# Patient Record
Sex: FEMALE | Race: WHITE | NOT HISPANIC OR LATINO
[De-identification: names, ages, dates, MRNs, and addresses within clinical notes are randomized per-mention and may not be internally consistent; named-entity substitution may affect disease eponyms.]

---

## 2017-03-22 ENCOUNTER — APPOINTMENT (OUTPATIENT)
Dept: DERMATOLOGY | Facility: CLINIC | Age: 67
End: 2017-03-22

## 2018-03-23 ENCOUNTER — APPOINTMENT (OUTPATIENT)
Dept: DERMATOLOGY | Facility: CLINIC | Age: 68
End: 2018-03-23

## 2018-11-09 ENCOUNTER — APPOINTMENT (OUTPATIENT)
Dept: DERMATOLOGY | Facility: CLINIC | Age: 68
End: 2018-11-09

## 2019-04-03 ENCOUNTER — APPOINTMENT (OUTPATIENT)
Dept: DERMATOLOGY | Facility: CLINIC | Age: 69
End: 2019-04-03
Payer: MEDICARE

## 2019-04-03 PROCEDURE — 99213 OFFICE O/P EST LOW 20 MIN: CPT

## 2020-04-03 ENCOUNTER — APPOINTMENT (OUTPATIENT)
Dept: DERMATOLOGY | Facility: CLINIC | Age: 70
End: 2020-04-03

## 2021-01-22 ENCOUNTER — APPOINTMENT (OUTPATIENT)
Dept: DERMATOLOGY | Facility: CLINIC | Age: 71
End: 2021-01-22

## 2021-04-02 ENCOUNTER — APPOINTMENT (OUTPATIENT)
Dept: DISASTER EMERGENCY | Facility: CLINIC | Age: 71
End: 2021-04-02

## 2021-04-03 LAB — SARS-COV-2 N GENE NPH QL NAA+PROBE: NOT DETECTED

## 2021-04-04 ENCOUNTER — FORM ENCOUNTER (OUTPATIENT)
Age: 71
End: 2021-04-04

## 2021-04-05 ENCOUNTER — TRANSCRIPTION ENCOUNTER (OUTPATIENT)
Age: 71
End: 2021-04-05

## 2021-04-05 ENCOUNTER — OUTPATIENT (OUTPATIENT)
Dept: OUTPATIENT SERVICES | Facility: HOSPITAL | Age: 71
LOS: 1 days | End: 2021-04-05
Payer: MEDICARE

## 2021-04-05 VITALS
TEMPERATURE: 98 F | OXYGEN SATURATION: 99 % | WEIGHT: 135.14 LBS | DIASTOLIC BLOOD PRESSURE: 68 MMHG | SYSTOLIC BLOOD PRESSURE: 122 MMHG | HEIGHT: 60 IN | HEART RATE: 79 BPM | RESPIRATION RATE: 18 BRPM

## 2021-04-05 DIAGNOSIS — I34.0 NONRHEUMATIC MITRAL (VALVE) INSUFFICIENCY: ICD-10-CM

## 2021-04-05 LAB
ANION GAP SERPL CALC-SCNC: 6 MMOL/L — SIGNIFICANT CHANGE UP (ref 5–17)
BUN SERPL-MCNC: 15 MG/DL — SIGNIFICANT CHANGE UP (ref 8–20)
CALCIUM SERPL-MCNC: 9.5 MG/DL — SIGNIFICANT CHANGE UP (ref 8.6–10.2)
CHLORIDE SERPL-SCNC: 106 MMOL/L — SIGNIFICANT CHANGE UP (ref 98–107)
CO2 SERPL-SCNC: 29 MMOL/L — SIGNIFICANT CHANGE UP (ref 22–29)
CREAT SERPL-MCNC: 0.64 MG/DL — SIGNIFICANT CHANGE UP (ref 0.5–1.3)
GLUCOSE SERPL-MCNC: 97 MG/DL — SIGNIFICANT CHANGE UP (ref 70–99)
POTASSIUM SERPL-MCNC: 4.2 MMOL/L — SIGNIFICANT CHANGE UP (ref 3.5–5.3)
POTASSIUM SERPL-SCNC: 4.2 MMOL/L — SIGNIFICANT CHANGE UP (ref 3.5–5.3)
SODIUM SERPL-SCNC: 141 MMOL/L — SIGNIFICANT CHANGE UP (ref 135–145)

## 2021-04-05 PROCEDURE — 93320 DOPPLER ECHO COMPLETE: CPT

## 2021-04-05 PROCEDURE — 36415 COLL VENOUS BLD VENIPUNCTURE: CPT

## 2021-04-05 PROCEDURE — 93005 ELECTROCARDIOGRAM TRACING: CPT

## 2021-04-05 PROCEDURE — 85730 THROMBOPLASTIN TIME PARTIAL: CPT

## 2021-04-05 PROCEDURE — 85610 PROTHROMBIN TIME: CPT

## 2021-04-05 PROCEDURE — 93010 ELECTROCARDIOGRAM REPORT: CPT

## 2021-04-05 PROCEDURE — 80048 BASIC METABOLIC PNL TOTAL CA: CPT

## 2021-04-05 PROCEDURE — 85025 COMPLETE CBC W/AUTO DIFF WBC: CPT

## 2021-04-05 PROCEDURE — 93325 DOPPLER ECHO COLOR FLOW MAPG: CPT

## 2021-04-05 PROCEDURE — 93312 ECHO TRANSESOPHAGEAL: CPT

## 2021-04-05 RX ORDER — CHLORHEXIDINE GLUCONATE 213 G/1000ML
1 SOLUTION TOPICAL ONCE
Refills: 0 | Status: DISCONTINUED | OUTPATIENT
Start: 2021-04-05 | End: 2021-04-19

## 2021-04-05 NOTE — H&P PST ADULT - NSICDXPASTMEDICALHX_GEN_ALL_CORE_FT
PAST MEDICAL HISTORY:  HLD (hyperlipidemia)     MVP (mitral valve prolapse)     Severe mitral regurgitation

## 2021-04-05 NOTE — DISCHARGE NOTE PROVIDER - NSDCFUADDAPPT_GEN_ALL_CORE_FT
Please call and make an appointment with Dr. Loredo in 1-2 weeks for post procedure hospital follow up and outpatient work up for severe Mitral Regurgitation.   Please call and make an appointment with Dr. Loredo in 1-2 weeks for post procedure hospital follow up and outpatient work up for severe Mitral Regurgitation, valve repair.     Outpatient right and left heart catheterization to be arranged by cardiology.

## 2021-04-05 NOTE — DISCHARGE NOTE PROVIDER - NSDCCPTREATMENT_GEN_ALL_CORE_FT
PRINCIPAL PROCEDURE  Procedure: Transesophageal echocardiography  Findings and Treatment:        PRINCIPAL PROCEDURE  Procedure: Transesophageal echocardiography  Findings and Treatment: Transechocardiogram which showed Myxomatous MV with flail P2, ruptured chordae and severe eccentric MR. Trace TR. ERO 0.8 cm2, regurgitant volume 111 ml  -continue current medical management   -will need right and left heart catheterization  -Valve repair   -follow with Dr. Loredo

## 2021-04-05 NOTE — DISCHARGE NOTE NURSING/CASE MANAGEMENT/SOCIAL WORK - PATIENT PORTAL LINK FT
You can access the FollowMyHealth Patient Portal offered by [facility] by registering at the following website: [email or web address]. By joining [employer]’s FollowMyHealth portal, you will also be able to view your health information using other applications (apps) compatible with our system.

## 2021-04-05 NOTE — H&P PST ADULT - HISTORY OF PRESENT ILLNESS
Narrative:     69 yo female with PMHX of HLD, Mitral Valve Prolapse with previous moderate mitral vegetation with normal left ventricular function, rate PSVT , previously documented atrial septal aneurysm without shunt who presents now for follow up. Last echocardiogram 3/12/21 showed LVEF 55-60%, normal RV size and function, severe posterior leaflet and eccentric severe mitral regurgitation trace TR, moderate pulmonary hypertension PA pressure estimatd at 58 mmHg, trace AI.     ASA   2  Mallampati   2    COVID-19    negative       Assessment of LVEF (Must be within 6 months):       EF:   55-60%        Assessed by:   Echocardiogram        Date:   3/12/21     Prior Cardiac Interventions (LHC, stents, CABG):       PCI's (Date, Stents, Vessels):   NO        CABG (Date, Grafts):   NO

## 2021-04-05 NOTE — DISCHARGE NOTE PROVIDER - CARE PROVIDER_API CALL
Sydnee Loredo)  Internal Medicine  [address] [place] [id number]  Phone: [phone]  Fax: [phone]  Follow Up Time:

## 2021-04-05 NOTE — DISCHARGE NOTE PROVIDER - NSDCMRMEDTOKEN_GEN_ALL_CORE_FT
atenolol 25 mg oral tablet: 1 tab(s) orally once a day  atorvastatin 10 mg oral tablet: 1 tab(s) orally once a day  Calcium 500+D oral tablet, chewable:

## 2021-04-05 NOTE — DISCHARGE NOTE PROVIDER - NSDCCPCAREPLAN_GEN_ALL_CORE_FT
PRINCIPAL DISCHARGE DIAGNOSIS  Diagnosis: Severe mitral valve stenosis  Assessment and Plan of Treatment: Patient with known severe mitral stenosis; found with severe posterior leaflet and eccentric severe mitral regurgitation trace TR, moderate pulmonary hypertension PA pressure estimatd at 58 mmHg, trace AI. Concern for ruptured chordae. Had transesophageal echocardiogram today to evaluate heart anatomy. Likely          PRINCIPAL DISCHARGE DIAGNOSIS  Diagnosis: Severe mitral valve stenosis  Assessment and Plan of Treatment: Patient with known severe mitral stenosis; found with severe posterior leaflet and eccentric severe mitral regurgitation trace TR, moderate pulmonary hypertension PA pressure estimatd at 58 mmHg, trace AI. Concern for ruptured chordae. Had transesophageal echocardiogram today to evaluate heart anatomy.

## 2021-04-05 NOTE — DISCHARGE NOTE NURSING/CASE MANAGEMENT/SOCIAL WORK - NSDCFUADDAPPT_GEN_ALL_CORE_FT
Please call and make an appointment with Dr. Loredo in 1-2 weeks for post procedure hospital follow up and outpatient work up for severe Mitral Regurgitation, valve repair.     Outpatient right and left heart catheterization to be arranged by cardiology.

## 2021-04-05 NOTE — H&P PST ADULT - ASSESSMENT
69 yo female with PMHX of HLD, Mitral Valve Prolapse with previous moderate mitral vegetation with normal left ventricular function, rate PSVT , previously documented atrial septal aneurysm without shunt who presents now for follow up. Last echocardiogram 3/12/21 showed LVEF 55-60%, normal RV size and function, severe posterior leaflet and eccentric severe mitral regurgitation trace TR, moderate pulmonary hypertension PA pressure estimatd at 58 mmHg, trace AI.       -pt has been NPO since MN  -labs, ekg ordered  -consent to be obtained by attending and anesthesia  -PARVEEN order placed

## 2021-04-05 NOTE — PROGRESS NOTE ADULT - ASSESSMENT
Assessment  Flail P2 MV with ruptured chordae and severe MR  Normal EF  Trace TR normal RV      Rec  R & L cardiac cath and elective MV repair

## 2021-04-05 NOTE — DISCHARGE NOTE PROVIDER - HOSPITAL COURSE
71 yo female with PMHX of HLD, Mitral Valve Prolapse with previous moderate mitral vegetation with normal left ventricular function, rate PSVT , previously documented atrial septal aneurysm without shunt who presents now for follow up. Last echocardiogram 3/12/21 showed LVEF 55-60%, normal RV size and function, severe posterior leaflet and eccentric severe mitral regurgitation trace TR, moderate pulmonary hypertension PA pressure estimatd at 58 mmHg, trace AI. 69 yo female with PMHX of HLD, Mitral Valve Prolapse with previous moderate mitral vegetation with normal left ventricular function, rate PSVT , previously documented atrial septal aneurysm without shunt who presents now for follow up. Last echocardiogram 3/12/21 showed LVEF 55-60%, normal RV size and function, severe posterior leaflet and eccentric severe mitral regurgitation trace TR, moderate pulmonary hypertension PA pressure estimatd at 58 mmHg, trace AI.     Patient now post transesophageal echocardiogram which showed Myxomatous MV with flail P2, ruptured chordae and severe eccentric MR. Trace TR. ERO 0.8 cm2, regurgitant volume 111 ml. Outpatient follow up for right and left heart catheterization prior to valve repair. Follow with Dr. Loredo for plan and scheduling.

## 2021-04-05 NOTE — DISCHARGE NOTE PROVIDER - NSDCFUADDINST_GEN_ALL_CORE_FT
-Take each dose of your anticoagulation medication (blood thinner) exactly as prescribed.   -Resume your diet with soft foods first.  - Do not drive, operate heavy machinery or make important decisions for 24 hours following the procedure.  - You may resume all other activities the day after the procedure.  Call your doctor if:   -you develop a fever, cough up blood, have any chest pain, palpitations or difficulty breathing. You may take a throat lozenge if you develop a sore throat or try warm salt water gargle.   - you have any questions or concerns regarding the procedure.  If you experience increased difficulty breathing or chest pain, or if you faint, have dizzy spells, or for any other distressing symptom, please seek immediate medical attention.

## 2021-04-05 NOTE — H&P PST ADULT - RS GEN PE MLT RESP DETAILS PC
normal/airway patent/respirations non-labored/clear to auscultation bilaterally/no rhonchi/no wheezes

## 2021-04-06 ENCOUNTER — APPOINTMENT (OUTPATIENT)
Dept: DISASTER EMERGENCY | Facility: CLINIC | Age: 71
End: 2021-04-06

## 2021-04-07 LAB — SARS-COV-2 N GENE NPH QL NAA+PROBE: NOT DETECTED

## 2021-04-08 RX ORDER — CHLORHEXIDINE GLUCONATE 213 G/1000ML
1 SOLUTION TOPICAL ONCE
Refills: 0 | Status: DISCONTINUED | OUTPATIENT
Start: 2021-04-09 | End: 2021-04-24

## 2021-04-08 NOTE — H&P PST ADULT - HISTORY OF PRESENT ILLNESS
71 yo female with PMHX of HLD, Mitral Valve Prolapse with previous moderate mitral vegetation with normal left ventricular function, rate PSVT , previously documented atrial septal aneurysm without shunt who presents now for follow up. Last echocardiogram 3/12/21 showed LVEF 55-60%, normal RV size and function, severe posterior leaflet and eccentric severe mitral regurgitation trace TR, moderate pulmonary hypertension PA pressure estimatd at 58 mmHg, trace AI.   PARVEEN performed on 4/4/21 revealed:   1. Left ventricular ejection fraction, by visual estimation, is 60 to 65%.   2. Normal left ventricular internal cavity size.   3. 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 ERO 0.8 cm2, regurgitant volume 111 ml. Confirmed with 3 D imaging.   4. Moderate to severe left atrial enlargement.   5. Elevated mean left atrial pressure.   6. No evidence of SEC nor thrombus in LA/PATRICIA. Max LA diameter 6.4 cm.   7. Normal right atrial size.   8. Sclerotic aortic valve with normal opening.   9. Color flow doppler and intravenous injection of agitated saline demonstrates the presence of an intact intra atrial septum.  10. No left atrial appendage thrombus and normal left atrial appendage velocities.    Pt presents for R&L heart cath for further evaluation of her coronary anatomy.    ASA   2  Mallampati   2    COVID-19    negative     Symptoms:        Angina (Class):        Ischemic Symptoms:     Heart Failure:        Systolic/Diastolic/Combined:        NYHA Class (within 2 weeks):     Assessment of LVEF (Must be within 6 months):       EF:   55-60%        Assessed by:   Echocardiogram        Date:   3/12/21     Prior Cardiac Interventions (LHC, stents, CABG):       PCI's (Date, Stents, Vessels):   NO        CABG (Date, Grafts):   NO     Antianginal Therapies:        Beta Blockers:  Atenolol       Calcium Channel Blockers:  NA       Long Acting Nitrates: NA       Ranexa: NA    Associated Risk Factors:        Cerebrovascular Disease: N/A       Chronic Lung Disease: N/A       Peripheral Arterial Disease: N/A       Chronic Kidney Disease (if yes, what is GFR): N/A       Uncontrolled Diabetes (if yes, what is HgbA1C or FBS): N/A       Poorly Controlled Hypertension (if yes, what is SBP): N/A       Morbid Obesity (if yes, what is BMI): N/A       History of Recent Ventricular Arrhythmia: SVT       Inability to Ambulate Safely: N/A       Need for Therapeutic Anticoagulation: N/A       Antiplatelet or Contrast Allergy: N/A 71 yo female with PMHX of HLD, Mitral Valve Prolapse with previous moderate mitral vegetation with normal left ventricular function, rate PSVT , previously documented atrial septal aneurysm without shunt who presents now for follow up. Last echocardiogram 3/12/21 showed LVEF 55-60%, normal RV size and function, severe posterior leaflet and eccentric severe mitral regurgitation trace TR, moderate pulmonary hypertension PA pressure estimatd at 58 mmHg, trace AI.   PARVEEN performed on 4/4/21 revealed:   1. Left ventricular ejection fraction, by visual estimation, is 60 to 65%.   2. Normal left ventricular internal cavity size.   3. 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 ERO 0.8 cm2, regurgitant volume 111 ml. Confirmed with 3 D imaging.   4. Moderate to severe left atrial enlargement.   5. Elevated mean left atrial pressure.   6. No evidence of SEC nor thrombus in LA/PATRICIA. Max LA diameter 6.4 cm.   7. Normal right atrial size.   8. Sclerotic aortic valve with normal opening.   9. Color flow doppler and intravenous injection of agitated saline demonstrates the presence of an intact intra atrial septum.  10. No left atrial appendage thrombus and normal left atrial appendage velocities.    Pt presents for R&L heart cath for further evaluation of her coronary anatomy.    ASA   2  Mallampati   2    COVID-19    negative     Symptoms:        Angina (Class):        Ischemic Symptoms: SHELTON    Assessment of LVEF (Must be within 6 months):       EF:   55-60%        Assessed by:   Echocardiogram        Date:   3/12/21     Prior Cardiac Interventions (LHC, stents, CABG):       PCI's (Date, Stents, Vessels):   NO        CABG (Date, Grafts):   NO     Antianginal Therapies:        Beta Blockers:  Atenolol       Calcium Channel Blockers:  NA       Long Acting Nitrates: NA       Ranexa: NA    Associated Risk Factors:        Cerebrovascular Disease: N/A       Chronic Lung Disease: N/A       Peripheral Arterial Disease: N/A       Chronic Kidney Disease (if yes, what is GFR): N/A       Uncontrolled Diabetes (if yes, what is HgbA1C or FBS): N/A       Poorly Controlled Hypertension (if yes, what is SBP): N/A       Morbid Obesity (if yes, what is BMI): N/A       History of Recent Ventricular Arrhythmia: SVT       Inability to Ambulate Safely: N/A       Need for Therapeutic Anticoagulation: N/A       Antiplatelet or Contrast Allergy: N/A 71 yo female with PMHX of HLD, Mitral Valve Prolapse with previous moderate mitral vegetation with normal left ventricular function, rate PSVT , previously documented atrial septal aneurysm without shunt who presents now for follow up. Last echocardiogram 3/12/21 showed LVEF 55-60%, normal RV size and function, severe posterior leaflet and eccentric severe mitral regurgitation trace TR, moderate pulmonary hypertension PA pressure estimatd at 58 mmHg, trace AI.   PARVEEN performed on 4/4/21 revealed:   1. Left ventricular ejection fraction, by visual estimation, is 60 to 65%.   2. Normal left ventricular internal cavity size.   3. 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 ERO 0.8 cm2, regurgitant volume 111 ml. Confirmed with 3 D imaging.   4. Moderate to severe left atrial enlargement.   5. Elevated mean left atrial pressure.   6. No evidence of SEC nor thrombus in LA/PATRICIA. Max LA diameter 6.4 cm.   7. Normal right atrial size.   8. Sclerotic aortic valve with normal opening.   9. Color flow doppler and intravenous injection of agitated saline demonstrates the presence of an intact intra atrial septum.  10. No left atrial appendage thrombus and normal left atrial appendage velocities.    Pt presents for R&L heart cath for further evaluation of her coronary anatomy.    ASA   2  Mallampati   2    GFR 94  BRA  2.4%  COVID-19    negative     Symptoms:        Angina (Class):        Ischemic Symptoms: SHELTON    Assessment of LVEF (Must be within 6 months):       EF:   55-60%        Assessed by:   Echocardiogram        Date:   3/12/21     Prior Cardiac Interventions (LHC, stents, CABG):       PCI's (Date, Stents, Vessels):   NO        CABG (Date, Grafts):   NO     Antianginal Therapies:        Beta Blockers:  Atenolol       Calcium Channel Blockers:  NA       Long Acting Nitrates: NA       Ranexa: NA    Associated Risk Factors:        Cerebrovascular Disease: N/A       Chronic Lung Disease: N/A       Peripheral Arterial Disease: N/A       Chronic Kidney Disease (if yes, what is GFR): N/A       Uncontrolled Diabetes (if yes, what is HgbA1C or FBS): N/A       Poorly Controlled Hypertension (if yes, what is SBP): N/A       Morbid Obesity (if yes, what is BMI): N/A       History of Recent Ventricular Arrhythmia: SVT       Inability to Ambulate Safely: N/A       Need for Therapeutic Anticoagulation: N/A       Antiplatelet or Contrast Allergy: N/A

## 2021-04-08 NOTE — H&P PST ADULT - ASSESSMENT
69 y/o F with PMH MVP, severe MR and c/o palpitations presents for R&L heart catheterization for further assessment of her coronary anatomy    -NPO for procedure  -Consent to be obtained by MD prior to procedure 71 y/o F with PMH MVP, severe MR and c/o palpitations presents for R&L heart catheterization for further assessment of her coronary anatomy with eye toward MV surgery    -NPO for procedure  -Consent to be obtained by MD prior to procedure

## 2021-04-09 ENCOUNTER — TRANSCRIPTION ENCOUNTER (OUTPATIENT)
Age: 71
End: 2021-04-09

## 2021-04-09 ENCOUNTER — OUTPATIENT (OUTPATIENT)
Dept: OUTPATIENT SERVICES | Facility: HOSPITAL | Age: 71
LOS: 1 days | Discharge: HOME | End: 2021-04-09
Payer: MEDICARE

## 2021-04-09 VITALS
DIASTOLIC BLOOD PRESSURE: 67 MMHG | RESPIRATION RATE: 16 BRPM | SYSTOLIC BLOOD PRESSURE: 112 MMHG | OXYGEN SATURATION: 96 % | HEART RATE: 71 BPM

## 2021-04-09 VITALS
SYSTOLIC BLOOD PRESSURE: 130 MMHG | HEART RATE: 77 BPM | OXYGEN SATURATION: 99 % | TEMPERATURE: 98 F | RESPIRATION RATE: 16 BRPM | DIASTOLIC BLOOD PRESSURE: 70 MMHG

## 2021-04-09 DIAGNOSIS — I34.8 OTHER NONRHEUMATIC MITRAL VALVE DISORDERS: ICD-10-CM

## 2021-04-09 DIAGNOSIS — Z98.51 TUBAL LIGATION STATUS: Chronic | ICD-10-CM

## 2021-04-09 LAB
ANION GAP SERPL CALC-SCNC: 10 MMOL/L — SIGNIFICANT CHANGE UP (ref 5–17)
APTT BLD: 34.9 SEC — SIGNIFICANT CHANGE UP (ref 27.5–35.5)
BASOPHILS # BLD AUTO: 0.05 K/UL — SIGNIFICANT CHANGE UP (ref 0–0.2)
BASOPHILS NFR BLD AUTO: 1.1 % — SIGNIFICANT CHANGE UP (ref 0–2)
BUN SERPL-MCNC: 14 MG/DL — SIGNIFICANT CHANGE UP (ref 8–20)
CALCIUM SERPL-MCNC: 9.3 MG/DL — SIGNIFICANT CHANGE UP (ref 8.6–10.2)
CHLORIDE SERPL-SCNC: 104 MMOL/L — SIGNIFICANT CHANGE UP (ref 98–107)
CO2 SERPL-SCNC: 28 MMOL/L — SIGNIFICANT CHANGE UP (ref 22–29)
CREAT SERPL-MCNC: 0.54 MG/DL — SIGNIFICANT CHANGE UP (ref 0.5–1.3)
EOSINOPHIL # BLD AUTO: 0.13 K/UL — SIGNIFICANT CHANGE UP (ref 0–0.5)
EOSINOPHIL NFR BLD AUTO: 2.7 % — SIGNIFICANT CHANGE UP (ref 0–6)
GLUCOSE SERPL-MCNC: 86 MG/DL — SIGNIFICANT CHANGE UP (ref 70–99)
HCT VFR BLD CALC: 38.9 % — SIGNIFICANT CHANGE UP (ref 34.5–45)
HGB BLD-MCNC: 11.9 G/DL — SIGNIFICANT CHANGE UP (ref 11.5–15.5)
IMM GRANULOCYTES NFR BLD AUTO: 0.2 % — SIGNIFICANT CHANGE UP (ref 0–1.5)
INR BLD: 1.04 RATIO — SIGNIFICANT CHANGE UP (ref 0.88–1.16)
LYMPHOCYTES # BLD AUTO: 1.48 K/UL — SIGNIFICANT CHANGE UP (ref 1–3.3)
LYMPHOCYTES # BLD AUTO: 31.3 % — SIGNIFICANT CHANGE UP (ref 13–44)
MCHC RBC-ENTMCNC: 29.5 PG — SIGNIFICANT CHANGE UP (ref 27–34)
MCHC RBC-ENTMCNC: 30.6 GM/DL — LOW (ref 32–36)
MCV RBC AUTO: 96.3 FL — SIGNIFICANT CHANGE UP (ref 80–100)
MONOCYTES # BLD AUTO: 0.35 K/UL — SIGNIFICANT CHANGE UP (ref 0–0.9)
MONOCYTES NFR BLD AUTO: 7.4 % — SIGNIFICANT CHANGE UP (ref 2–14)
NEUTROPHILS # BLD AUTO: 2.71 K/UL — SIGNIFICANT CHANGE UP (ref 1.8–7.4)
NEUTROPHILS NFR BLD AUTO: 57.3 % — SIGNIFICANT CHANGE UP (ref 43–77)
PLATELET # BLD AUTO: 232 K/UL — SIGNIFICANT CHANGE UP (ref 150–400)
POTASSIUM SERPL-MCNC: 4.2 MMOL/L — SIGNIFICANT CHANGE UP (ref 3.5–5.3)
POTASSIUM SERPL-SCNC: 4.2 MMOL/L — SIGNIFICANT CHANGE UP (ref 3.5–5.3)
PROTHROM AB SERPL-ACNC: 12 SEC — SIGNIFICANT CHANGE UP (ref 10.6–13.6)
RBC # BLD: 4.04 M/UL — SIGNIFICANT CHANGE UP (ref 3.8–5.2)
RBC # FLD: 14 % — SIGNIFICANT CHANGE UP (ref 10.3–14.5)
SODIUM SERPL-SCNC: 142 MMOL/L — SIGNIFICANT CHANGE UP (ref 135–145)
WBC # BLD: 4.73 K/UL — SIGNIFICANT CHANGE UP (ref 3.8–10.5)
WBC # FLD AUTO: 4.73 K/UL — SIGNIFICANT CHANGE UP (ref 3.8–10.5)

## 2021-04-09 PROCEDURE — 80048 BASIC METABOLIC PNL TOTAL CA: CPT

## 2021-04-09 PROCEDURE — 85730 THROMBOPLASTIN TIME PARTIAL: CPT

## 2021-04-09 PROCEDURE — 85610 PROTHROMBIN TIME: CPT

## 2021-04-09 PROCEDURE — C1769: CPT

## 2021-04-09 PROCEDURE — C1894: CPT

## 2021-04-09 PROCEDURE — 93005 ELECTROCARDIOGRAM TRACING: CPT

## 2021-04-09 PROCEDURE — 93010 ELECTROCARDIOGRAM REPORT: CPT

## 2021-04-09 PROCEDURE — 99152 MOD SED SAME PHYS/QHP 5/>YRS: CPT

## 2021-04-09 PROCEDURE — 85025 COMPLETE CBC W/AUTO DIFF WBC: CPT

## 2021-04-09 PROCEDURE — 36415 COLL VENOUS BLD VENIPUNCTURE: CPT

## 2021-04-09 PROCEDURE — 93460 R&L HRT ART/VENTRICLE ANGIO: CPT

## 2021-04-09 PROCEDURE — C1760: CPT

## 2021-04-09 PROCEDURE — C1887: CPT

## 2021-04-09 PROCEDURE — C1889: CPT

## 2021-04-09 NOTE — DISCHARGE NOTE PROVIDER - NSDCCPTREATMENT_GEN_ALL_CORE_FT
PRINCIPAL PROCEDURE  Procedure: Left and right heart catheterization  Findings and Treatment: Severe MR and normal coronaries

## 2021-04-09 NOTE — DISCHARGE NOTE PROVIDER - NSDCCPCAREPLAN_GEN_ALL_CORE_FT
PRINCIPAL DISCHARGE DIAGNOSIS  Diagnosis: Severe mitral regurgitation  Assessment and Plan of Treatment:       SECONDARY DISCHARGE DIAGNOSES  Diagnosis: HLD (hyperlipidemia)  Assessment and Plan of Treatment:

## 2021-04-09 NOTE — DISCHARGE NOTE PROVIDER - CARE PROVIDERS DIRECT ADDRESSES
,DirectAddress_Unknown,[email or web address]

## 2021-04-09 NOTE — ASU PATIENT PROFILE, ADULT - AS SC BRADEN FRICTION
Antibiotic ointment to toe.  Return if toe is more red or any concerns in AM.   (3) no apparent problem

## 2021-04-09 NOTE — PROGRESS NOTE ADULT - SUBJECTIVE AND OBJECTIVE BOX
Department of Cardiology                                                                  [facility]/[place] [address] [address] [facility] [place]                                                            Telephone: [phone]. Fax:[phone]                                                    Post- Procedure Note: Left Heart Cardiac Catheterization       HPI:  71 yo female with PMHX of HLD, Mitral Valve Prolapse with previous moderate mitral vegetation with normal left ventricular function, rate PSVT , previously documented atrial septal aneurysm without shunt who presents now for follow up. Last echocardiogram 3/12/21 showed LVEF 55-60%, normal RV size and function, severe posterior leaflet and eccentric severe mitral regurgitation trace TR, moderate pulmonary hypertension PA pressure estimatd at 58 mmHg, trace AI.   PARVEEN performed on 4/4/21 revealed:   1. Left ventricular ejection fraction, by visual estimation, is 60 to 65%.   2. Normal left ventricular internal cavity size.   3. 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 ERO 0.8 cm2, regurgitant volume 111 ml. Confirmed with 3 D imaging.   4. Moderate to severe left atrial enlargement.   5. Elevated mean left atrial pressure.   6. No evidence of SEC nor thrombus in LA/PATRICIA. Max LA diameter 6.4 cm.   7. Normal right atrial size.   8. Sclerotic aortic valve with normal opening.   9. Color flow doppler and intravenous injection of agitated saline demonstrates the presence of an intact intra atrial septum.  10. No left atrial appendage thrombus and normal left atrial appendage velocities.    Pt presents for R&L heart cath for further evaluation of her coronary anatomy.  She now s/p right and left heart catheterization via right groin approach with Dr. Card: Artery closed with Angioseal and 7Fr V sheath in place; Normal coronary arteries, wide open/severe MR, LVEDP 25mmHg, PCWP 22(prelim report)  Pt tolerated procedure well and denies any CP, SOB or groin pain       PAST MEDICAL & SURGICAL HISTORY:  HLD (hyperlipidemia)  MVP (mitral valve prolapse)  Severe mitral regurgitation  H/O tubal ligation    Home Medications:  atenolol 25 mg oral tablet: 1 tab(s) orally once a day (09 Apr 2021 13:39)  atorvastatin 10 mg oral tablet: 1 tab(s) orally once a day (09 Apr 2021 13:39)  Calcium 500+D oral tablet, chewable:  (09 Apr 2021 13:39)    Objective:  Vital Signs Last 24 Hrs  T(C): 36.6 (09 Apr 2021 13:48), Max: 36.6 (09 Apr 2021 13:48)  T(F): 97.9 (09 Apr 2021 13:48), Max: 97.9 (09 Apr 2021 13:48)  HR: 80 (09 Apr 2021 16:30) (77 - 80)  BP: 122/76 (09 Apr 2021 16:30) (122/76 - 130/70)  BP(mean): --  RR: 16 (09 Apr 2021 16:30) (16 - 16)  SpO2: 96% (09 Apr 2021 16:30) (96% - 99%)    CM: SR  Neuro: A&OX3, CN 2-12 intact  HEENT: NC, AT  Lungs: CTA B/L  CV: S1, S2, 2/6 syst murmur  Abd: Soft  Right Groin: Soft, no bleeding, no hematoma, 7Fr sheath in place  Extremity: + distal pulses                          11.9   4.73  )-----------( 232      ( 09 Apr 2021 13:56 )             38.9     04-09    142  |  104  |  14.0  ----------------------------<  86  4.2   |  28.0  |  0.54    Ca    9.3      09 Apr 2021 13:56      PT/INR - ( 09 Apr 2021 13:56 )   PT: 12.0 sec;   INR: 1.04 ratio    PTT - ( 09 Apr 2021 13:56 )  PTT:34.9 sec    71 yo female with PMHX of HLD, Mitral Valve Prolapse with previous moderate mitral vegetation with normal left ventricular function, rate PSVT , previously documented atrial septal aneurysm without shunt who presents now for follow up. Last echocardiogram 3/12/21 showed LVEF 55-60%, normal RV size and function, severe posterior leaflet and eccentric severe mitral regurgitation trace TR, moderate pulmonary hypertension PA pressure estimatd at 58 mmHg, trace AI.   PARVEEN performed on 4/4/21 revealed severe MR   right and left heart catheterization via right groin approach with Dr. Card: Artery closed with Angioseal and 7Fr V sheath in place; Normal coronary arteries, wide open/severe MR, LVEDP 25mmHg, PCWP 22(prelim report)    -post cardiac cath orders  -groin precautions  -bedrest x 2 hours post procedure  -continue current medical therapy  -Outpt cardiac surgery referral per Dr Loredo  -follow up outpt in 2 weeks with Cardiologist-Dr Loredo  -Discharge after 19:00 if all remain WNL

## 2021-04-09 NOTE — DISCHARGE NOTE PROVIDER - NSDCFUADDAPPT_GEN_ALL_CORE_FT
Follow up with Dr Loredo in 2 weeks  Call to make outpatient appointment for cardiac surgery consultation

## 2021-04-09 NOTE — DISCHARGE NOTE NURSING/CASE MANAGEMENT/SOCIAL WORK - PATIENT PORTAL LINK FT
You can access the FollowMyHealth Patient Portal offered by [facility] by registering at the following website: [email or web address]. By joining [employer]’s FollowMyHealth portal, you will also be able to view your health information using other applications (apps) compatible with our system.

## 2021-04-09 NOTE — DISCHARGE NOTE PROVIDER - HOSPITAL COURSE
69 yo female with PMHX of HLD, Mitral Valve Prolapse with previous moderate mitral vegetation with normal left ventricular function, rate PSVT , previously documented atrial septal aneurysm without shunt who presents now for follow up. Last echocardiogram 3/12/21 showed LVEF 55-60%, normal RV size and function, severe posterior leaflet and eccentric severe mitral regurgitation trace TR, moderate pulmonary hypertension PA pressure estimatd at 58 mmHg, trace AI.   PARVEEN performed on 4/4/21 revealed:   1. Left ventricular ejection fraction, by visual estimation, is 60 to 65%.   2. Normal left ventricular internal cavity size.   3. 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 ERO 0.8 cm2, regurgitant volume 111 ml. Confirmed with 3 D imaging.   4. Moderate to severe left atrial enlargement.   5. Elevated mean left atrial pressure.   6. No evidence of SEC nor thrombus in LA/PATRICIA. Max LA diameter 6.4 cm.   7. Normal right atrial size.   8. Sclerotic aortic valve with normal opening.   9. Color flow doppler and intravenous injection of agitated saline demonstrates the presence of an intact intra atrial septum.  10. No left atrial appendage thrombus and normal left atrial appendage velocities.    Pt presents for R&L heart cath for further evaluation of her coronary anatomy.  She now s/p right and left heart catheterization via right groin approach with Dr. Card: Artery closed with Angioseal and 7Fr V sheath in place; Normal coronary arteries, wide open/severe MR, LVEDP 25mmHg, PCWP 22(prelim report)  Pt tolerated procedure well and denies any CP, SOB or groin pain     Venous sheath removed. Pt tolerating a diet, ambulating and voiding independently. She is stable for discharge

## 2021-04-09 NOTE — ASU PATIENT PROFILE, ADULT - AS SC BRADEN SENSORY
Continue Regimen: Clindamycin Detail Level: Zone Render In Strict Bullet Format?: No Modify Regimen: Keflex 250 mg (4) no impairment

## 2021-04-09 NOTE — DISCHARGE NOTE PROVIDER - CARE PROVIDER_API CALL
Sydnee Loredo)  Internal Medicine  [address] [place] [id number]  Phone: [phone]  Fax: [phone]  Follow Up Time:     Lalo Rossi)  Surgery; Thoracic and Cardiac Surgery  [address] [place] [address]  Phone: [phone]  Fax: [phone]  Follow Up Time:

## 2021-04-12 PROBLEM — Z86.39 HISTORY OF HYPERLIPIDEMIA: Status: RESOLVED | Noted: 2021-04-12 | Resolved: 2021-04-12

## 2021-04-12 PROBLEM — Z86.79 HISTORY OF MITRAL VALVE PROLAPSE: Status: RESOLVED | Noted: 2021-04-12 | Resolved: 2021-04-12

## 2021-04-13 ENCOUNTER — APPOINTMENT (OUTPATIENT)
Dept: CARDIOTHORACIC SURGERY | Facility: CLINIC | Age: 71
End: 2021-04-13
Payer: MEDICARE

## 2021-04-13 DIAGNOSIS — R06.02 SHORTNESS OF BREATH: ICD-10-CM

## 2021-04-13 DIAGNOSIS — I34.0 NONRHEUMATIC MITRAL (VALVE) INSUFFICIENCY: ICD-10-CM

## 2021-04-13 DIAGNOSIS — Z80.6 FAMILY HISTORY OF LEUKEMIA: ICD-10-CM

## 2021-04-13 DIAGNOSIS — Z86.39 PERSONAL HISTORY OF OTHER ENDOCRINE, NUTRITIONAL AND METABOLIC DISEASE: ICD-10-CM

## 2021-04-13 DIAGNOSIS — R00.2 PALPITATIONS: ICD-10-CM

## 2021-04-13 DIAGNOSIS — Z78.9 OTHER SPECIFIED HEALTH STATUS: ICD-10-CM

## 2021-04-13 DIAGNOSIS — Z82.49 FAMILY HISTORY OF ISCHEMIC HEART DISEASE AND OTHER DISEASES OF THE CIRCULATORY SYSTEM: ICD-10-CM

## 2021-04-13 DIAGNOSIS — Z86.79 PERSONAL HISTORY OF OTHER DISEASES OF THE CIRCULATORY SYSTEM: ICD-10-CM

## 2021-04-13 PROCEDURE — 99205 OFFICE O/P NEW HI 60 MIN: CPT

## 2021-04-14 VITALS
DIASTOLIC BLOOD PRESSURE: 72 MMHG | OXYGEN SATURATION: 98 % | RESPIRATION RATE: 16 BRPM | HEART RATE: 78 BPM | WEIGHT: 145 LBS | HEIGHT: 64 IN | SYSTOLIC BLOOD PRESSURE: 116 MMHG | BODY MASS INDEX: 24.75 KG/M2

## 2021-04-14 PROBLEM — R06.02 SHORTNESS OF BREATH: Status: ACTIVE | Noted: 2021-04-14

## 2021-04-14 PROBLEM — R00.2 HEART PALPITATIONS: Status: ACTIVE | Noted: 2021-04-14

## 2021-04-14 NOTE — REVIEW OF SYSTEMS
[Palpitations] : palpitations [Lower Ext Edema] : lower extremity edema [Shortness Of Breath] : shortness of breath [Negative] : Heme/Lymph [Feeling Poorly] : not feeling poorly [Feeling Tired] : not feeling tired [Chest Pain] : no chest pain [SOB on Exertion] : no shortness of breath during exertion

## 2021-04-14 NOTE — HISTORY OF PRESENT ILLNESS
[FreeTextEntry1] : Ms. COSTA is a 70 year old female referred by  who presents for consultation. Her past medical history includes HLD, mitral valve prolapse,mitral valve regurgitation, and atrial septal aneurysm. \par \par She reports to the office today accompanied by her daughter to discuss possible mitral valve interventions. \par At today's visit she reports that she has been having some shortness of breath and palpitations that occur mostly at night which she attributed to her stress levels being so high for the past year. At this time her  has end stage renal disease and is struggling to survive. She is his primary care giver. \par \par She denies any chest pain, lower extremity edema or syncope. \par \par

## 2021-04-14 NOTE — DATA REVIEWED
[FreeTextEntry1] : Transesophageal Echocardiogram from [facility] on 4/4/21\par  1. Left ventricular ejection fraction, by visual estimation, is 60 to 65%. \par  2. Normal left ventricular internal cavity size. \par  3. 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 ERO 0.8 cm2, regurgitant volume 111 ml. Confirmed with 3 D imaging. \par  4. Moderate to severe left atrial enlargement. \par  5. Elevated mean left atrial pressure. \par  6. No evidence of SEC nor thrombus in LA/PATRICIA. Max LA diameter 6.4 cm. \par  7. Normal right atrial size. \par  8. Sclerotic aortic valve with normal opening. \par  9. Color flow Doppler and intravenous injection of agitated saline demonstrates the presence of an intact intra atrial septum. \par 10. No left atrial appendage thrombus and normal left atrial appendage velocities.\par \par Cardiac Catheterization from 4/9/21 at [facility] \par -Mild 10% mid LAD stenosis \par -Severe mitral  valve regurgitation \par -Normal LVEF \par \par Carotid Artery Duplex from 03/12/21 at  [facility] \par - Normal Carotid Velocities bilaterally\par

## 2021-04-14 NOTE — REASON FOR VISIT
[Initial Evaluation] : an initial evaluation [Family Member] : family member [FreeTextEntry1] : mitral regurgitation

## 2021-04-14 NOTE — ASSESSMENT
[FreeTextEntry1] : At today's visit I reviewed the imaging obtained and it is clear that Ms Melendez has significant mitral valve disease. I believe that this will be able to be repaired surgically (95% chance quoted). We discussed the pathophysiology of mitral valve prolapse and ruptured chordae as it pertains to her specific case. At this moment she is going through a stressful time at home and her  is very ill. She has had some shortness of breath and palpitations that she attributed to anxiety but can also be related to her condition. \par \par After review and discussion I am recommending Mitral Valve Repair via sternotomy with the 5% chance of replacement. If the valve must be replaced I will replace it with a porcine valve and she will require 3 months of Warfarin treatment. The procedure, hospital stay and recovery was discussed in detail. All risks, benefits, and alternatives discussed at length with patient. All questions addressed. Patient would like to proceed with surgical intervention as discussed. Prior to surgery she will need to have Pulmonary Function Studies performed. \par \par PLAN:\par - Pulmonary Function Studies\par - Mitral Valve Repair/ possible replacement (tissue valve) \par \par \par \par \par \par ITony NP am scribing for and in the presence of Dr. Rossi the following sections HISTORY OF PRESENT ILLNESS, PAST MEDICAL/FAMILY/SOCIAL HISTORY; REVIEW OF SYSTEMS; VITAL SIGNS; PHYSICAL EXAM; DISPOSITION.\par \par "I personally performed the services described in the documentation, reviewed the documentation recorded by the scribe in my presence and accurately and completely records my words and actions."\par \par \par \par \par

## 2021-04-14 NOTE — CONSULT LETTER
[Dear  ___] : Dear  [unfilled], [Courtesy Letter:] : I had the pleasure of seeing your patient, [unfilled], in my office today. [( Thank you for referring [unfilled] for consultation for _____ )] : Thank you for referring [unfilled] for consultation for [unfilled] [Please see my note below.] : Please see my note below. [Consult Closing:] : Thank you very much for allowing me to participate in the care of this patient.  If you have any questions, please do not hesitate to contact me. [Sincerely,] : Sincerely, [FreeTextEntry3] : Lalo Rossi MD\par  of Cardiothoracic Surgery\par [facility]\par [address] \[place] [address]\par [phone]\par \par  \par   [FreeTextEntry2] : Sydnee Loredo) \par Internal Medicine \par [address] [place] [address] \par [place], [place] [id number] \par Phone: [phone] \par Fax: [phone] \par

## 2021-04-14 NOTE — PHYSICAL EXAM
[General Appearance - Well Nourished] : well nourished [General Appearance - Well Developed] : well developed [Sclera] : the sclera and conjunctiva were normal [Outer Ear] : the ears and nose were normal in appearance [Neck Appearance] : the appearance of the neck was normal [Respiration, Rhythm And Depth] : normal respiratory rhythm and effort [Auscultation Breath Sounds / Voice Sounds] : lungs were clear to auscultation bilaterally [Heart Rate And Rhythm] : heart rate was normal and rhythm regular [Examination Of The Chest] : the chest was normal in appearance [0] : left 0 [2+] : left 2+ [Abnormal Walk] : normal gait [Motor Tone] : muscle strength and tone were normal [Skin Color & Pigmentation] : normal skin color and pigmentation [Sensation] : the sensory exam was normal to light touch and pinprick [Motor Exam] : the motor exam was normal [Oriented To Time, Place, And Person] : oriented to person, place, and time [Impaired Insight] : insight and judgment were intact [FreeTextEntry1] : [facility]HAC I    Grade 3/6 systolic murmur

## 2021-04-23 ENCOUNTER — APPOINTMENT (OUTPATIENT)
Dept: DERMATOLOGY | Facility: CLINIC | Age: 71
End: 2021-04-23
Payer: MEDICARE

## 2021-04-23 PROCEDURE — 99213 OFFICE O/P EST LOW 20 MIN: CPT

## 2021-05-21 ENCOUNTER — APPOINTMENT (OUTPATIENT)
Dept: DISASTER EMERGENCY | Facility: CLINIC | Age: 71
End: 2021-05-21

## 2021-05-22 LAB — SARS-COV-2 N GENE NPH QL NAA+PROBE: NOT DETECTED

## 2021-05-24 ENCOUNTER — OUTPATIENT (OUTPATIENT)
Dept: OUTPATIENT SERVICES | Facility: HOSPITAL | Age: 71
LOS: 1 days | End: 2021-05-24
Payer: MEDICARE

## 2021-05-24 ENCOUNTER — APPOINTMENT (OUTPATIENT)
Dept: PULMONOLOGY | Facility: CLINIC | Age: 71
End: 2021-05-24
Payer: MEDICARE

## 2021-05-24 ENCOUNTER — RESULT REVIEW (OUTPATIENT)
Age: 71
End: 2021-05-24

## 2021-05-24 VITALS — BODY MASS INDEX: 24.74 KG/M2 | WEIGHT: 126 LBS | HEIGHT: 60 IN | TEMPERATURE: 97.6 F

## 2021-05-24 VITALS
HEART RATE: 68 BPM | DIASTOLIC BLOOD PRESSURE: 78 MMHG | TEMPERATURE: 98 F | WEIGHT: 130.07 LBS | RESPIRATION RATE: 20 BRPM | HEIGHT: 61 IN | SYSTOLIC BLOOD PRESSURE: 117 MMHG

## 2021-05-24 DIAGNOSIS — Z29.9 ENCOUNTER FOR PROPHYLACTIC MEASURES, UNSPECIFIED: ICD-10-CM

## 2021-05-24 DIAGNOSIS — Z98.51 TUBAL LIGATION STATUS: Chronic | ICD-10-CM

## 2021-05-24 DIAGNOSIS — Z98.890 OTHER SPECIFIED POSTPROCEDURAL STATES: Chronic | ICD-10-CM

## 2021-05-24 DIAGNOSIS — E78.5 HYPERLIPIDEMIA, UNSPECIFIED: ICD-10-CM

## 2021-05-24 DIAGNOSIS — I34.0 NONRHEUMATIC MITRAL (VALVE) INSUFFICIENCY: ICD-10-CM

## 2021-05-24 DIAGNOSIS — I10 ESSENTIAL (PRIMARY) HYPERTENSION: ICD-10-CM

## 2021-05-24 DIAGNOSIS — Z01.818 ENCOUNTER FOR OTHER PREPROCEDURAL EXAMINATION: ICD-10-CM

## 2021-05-24 LAB
A1C WITH ESTIMATED AVERAGE GLUCOSE RESULT: 4.8 % — SIGNIFICANT CHANGE UP (ref 4–5.6)
ALBUMIN SERPL ELPH-MCNC: 4.3 G/DL — SIGNIFICANT CHANGE UP (ref 3.3–5.2)
ALP SERPL-CCNC: 39 U/L — LOW (ref 40–120)
ALT FLD-CCNC: 15 U/L — SIGNIFICANT CHANGE UP
ANION GAP SERPL CALC-SCNC: 7 MMOL/L — SIGNIFICANT CHANGE UP (ref 5–17)
APPEARANCE UR: CLEAR — SIGNIFICANT CHANGE UP
APTT BLD: 31.3 SEC — SIGNIFICANT CHANGE UP (ref 27.5–35.5)
AST SERPL-CCNC: 17 U/L — SIGNIFICANT CHANGE UP
BASOPHILS # BLD AUTO: 0.04 K/UL — SIGNIFICANT CHANGE UP (ref 0–0.2)
BASOPHILS NFR BLD AUTO: 0.9 % — SIGNIFICANT CHANGE UP (ref 0–2)
BILIRUB DIRECT SERPL-MCNC: 0.1 MG/DL — SIGNIFICANT CHANGE UP (ref 0–0.3)
BILIRUB INDIRECT FLD-MCNC: 0.7 MG/DL — SIGNIFICANT CHANGE UP (ref 0.2–1)
BILIRUB SERPL-MCNC: 0.8 MG/DL — SIGNIFICANT CHANGE UP (ref 0.4–2)
BILIRUB UR-MCNC: NEGATIVE — SIGNIFICANT CHANGE UP
BLD GP AB SCN SERPL QL: SIGNIFICANT CHANGE UP
BUN SERPL-MCNC: 12 MG/DL — SIGNIFICANT CHANGE UP (ref 8–20)
CALCIUM SERPL-MCNC: 9.2 MG/DL — SIGNIFICANT CHANGE UP (ref 8.6–10.2)
CHLORIDE SERPL-SCNC: 106 MMOL/L — SIGNIFICANT CHANGE UP (ref 98–107)
CO2 SERPL-SCNC: 28 MMOL/L — SIGNIFICANT CHANGE UP (ref 22–29)
COLOR SPEC: YELLOW — SIGNIFICANT CHANGE UP
CREAT SERPL-MCNC: 0.52 MG/DL — SIGNIFICANT CHANGE UP (ref 0.5–1.3)
DIFF PNL FLD: NEGATIVE — SIGNIFICANT CHANGE UP
EOSINOPHIL # BLD AUTO: 0.12 K/UL — SIGNIFICANT CHANGE UP (ref 0–0.5)
EOSINOPHIL NFR BLD AUTO: 2.6 % — SIGNIFICANT CHANGE UP (ref 0–6)
ESTIMATED AVERAGE GLUCOSE: 91 MG/DL — SIGNIFICANT CHANGE UP (ref 68–114)
GLUCOSE SERPL-MCNC: 97 MG/DL — SIGNIFICANT CHANGE UP (ref 70–99)
GLUCOSE UR QL: NEGATIVE MG/DL — SIGNIFICANT CHANGE UP
HCT VFR BLD CALC: 40.9 % — SIGNIFICANT CHANGE UP (ref 34.5–45)
HGB BLD-MCNC: 12.3 G/DL — SIGNIFICANT CHANGE UP (ref 11.5–15.5)
IMM GRANULOCYTES NFR BLD AUTO: 0.2 % — SIGNIFICANT CHANGE UP (ref 0–1.5)
INR BLD: 1.03 RATIO — SIGNIFICANT CHANGE UP (ref 0.88–1.16)
KETONES UR-MCNC: NEGATIVE — SIGNIFICANT CHANGE UP
LEUKOCYTE ESTERASE UR-ACNC: NEGATIVE — SIGNIFICANT CHANGE UP
LYMPHOCYTES # BLD AUTO: 1.2 K/UL — SIGNIFICANT CHANGE UP (ref 1–3.3)
LYMPHOCYTES # BLD AUTO: 25.9 % — SIGNIFICANT CHANGE UP (ref 13–44)
MAGNESIUM SERPL-MCNC: 2.4 MG/DL — SIGNIFICANT CHANGE UP (ref 1.6–2.6)
MCHC RBC-ENTMCNC: 28.9 PG — SIGNIFICANT CHANGE UP (ref 27–34)
MCHC RBC-ENTMCNC: 30.1 GM/DL — LOW (ref 32–36)
MCV RBC AUTO: 96.2 FL — SIGNIFICANT CHANGE UP (ref 80–100)
MONOCYTES # BLD AUTO: 0.29 K/UL — SIGNIFICANT CHANGE UP (ref 0–0.9)
MONOCYTES NFR BLD AUTO: 6.3 % — SIGNIFICANT CHANGE UP (ref 2–14)
NEUTROPHILS # BLD AUTO: 2.97 K/UL — SIGNIFICANT CHANGE UP (ref 1.8–7.4)
NEUTROPHILS NFR BLD AUTO: 64.1 % — SIGNIFICANT CHANGE UP (ref 43–77)
NITRITE UR-MCNC: NEGATIVE — SIGNIFICANT CHANGE UP
NT-PROBNP SERPL-SCNC: 366 PG/ML — HIGH (ref 0–300)
PH UR: 7 — SIGNIFICANT CHANGE UP (ref 5–8)
PHOSPHATE SERPL-MCNC: 3.8 MG/DL — SIGNIFICANT CHANGE UP (ref 2.4–4.7)
PLATELET # BLD AUTO: 245 K/UL — SIGNIFICANT CHANGE UP (ref 150–400)
POTASSIUM SERPL-MCNC: 4.2 MMOL/L — SIGNIFICANT CHANGE UP (ref 3.5–5.3)
POTASSIUM SERPL-SCNC: 4.2 MMOL/L — SIGNIFICANT CHANGE UP (ref 3.5–5.3)
PREALB SERPL-MCNC: 22 MG/DL — SIGNIFICANT CHANGE UP (ref 18–38)
PROT SERPL-MCNC: 6.7 G/DL — SIGNIFICANT CHANGE UP (ref 6.6–8.7)
PROT UR-MCNC: NEGATIVE MG/DL — SIGNIFICANT CHANGE UP
PROTHROM AB SERPL-ACNC: 11.9 SEC — SIGNIFICANT CHANGE UP (ref 10.6–13.6)
RBC # BLD: 4.25 M/UL — SIGNIFICANT CHANGE UP (ref 3.8–5.2)
RBC # FLD: 13.4 % — SIGNIFICANT CHANGE UP (ref 10.3–14.5)
SODIUM SERPL-SCNC: 141 MMOL/L — SIGNIFICANT CHANGE UP (ref 135–145)
SP GR SPEC: 1 — LOW (ref 1.01–1.02)
T3 SERPL-MCNC: 108 NG/DL — SIGNIFICANT CHANGE UP (ref 80–200)
T4 AB SER-ACNC: 7.6 UG/DL — SIGNIFICANT CHANGE UP (ref 4.5–12)
TSH SERPL-MCNC: 1.05 UIU/ML — SIGNIFICANT CHANGE UP (ref 0.27–4.2)
UROBILINOGEN FLD QL: NEGATIVE MG/DL — SIGNIFICANT CHANGE UP
WBC # BLD: 4.63 K/UL — SIGNIFICANT CHANGE UP (ref 3.8–10.5)
WBC # FLD AUTO: 4.63 K/UL — SIGNIFICANT CHANGE UP (ref 3.8–10.5)

## 2021-05-24 PROCEDURE — 85018 HEMOGLOBIN: CPT | Mod: QW

## 2021-05-24 PROCEDURE — 93010 ELECTROCARDIOGRAM REPORT: CPT

## 2021-05-24 PROCEDURE — G0463: CPT

## 2021-05-24 PROCEDURE — 71046 X-RAY EXAM CHEST 2 VIEWS: CPT

## 2021-05-24 PROCEDURE — 94010 BREATHING CAPACITY TEST: CPT

## 2021-05-24 PROCEDURE — 94729 DIFFUSING CAPACITY: CPT

## 2021-05-24 PROCEDURE — 93005 ELECTROCARDIOGRAM TRACING: CPT

## 2021-05-24 PROCEDURE — 71046 X-RAY EXAM CHEST 2 VIEWS: CPT | Mod: 26

## 2021-05-24 PROCEDURE — 94727 GAS DIL/WSHOT DETER LNG VOL: CPT

## 2021-05-24 NOTE — PATIENT PROFILE ADULT - VISION (WITH CORRECTIVE LENSES IF THE PATIENT USUALLY WEARS THEM):
no fever and no chills. Normal vision: sees adequately in most situations; can see medication labels, newsprint

## 2021-05-24 NOTE — H&P PST ADULT - HISTORY OF PRESENT ILLNESS
71 year old female present today for PST. She reports DR. Loredo her cardiologist who monitor closely her MVP. Patient reports on recent PARVEEN it was noted with worsen mitral regurgitation and ruptured chordae. She is now schedule for Mitral valve repair , possible replacement with Dr. Rossi on 6/1/21     Cardiac Cath 4/12/21: Mild 10% mid LAd stenosis Severe mitral valve regurgitation  Normal LVEF  INTERVENTIONAL RECOMMENDATIONS: Referral to CT surgery for mitral valve  repair    PARVEEN 4/5/21 Summary:   1. Left ventricular ejection fraction, by visual estimation, is 60 to 65%.   2. Normal left ventricular internal cavity size.   3. 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 ERO 0.8 cm2, regurgitant volume 111 ml. Confirmed with 3 D imaging.   4. Moderate to severe left atrial enlargement.   5. Elevated mean left atrial pressure.   6. No evidence of SEC nor thrombus in LA/PATRICIA. Max LA diameter 6.4 cm.   7. Normal right atrial size.   8. Sclerotic aortic valve with normal opening.   9. Color flow doppler and intravenous injection of agitated saline demonstrates the presence of an intact intra atrial septum.  10. No left atrial appendage thrombus and normal left atrial appendage velocities.    MD Satish Electronically signed on 4/5/2021 at 9:44:05 AM

## 2021-05-24 NOTE — H&P PST ADULT - ASSESSMENT
CAPRINI SCORE [CLOT]    AGE RELATED RISK FACTORS                                                       MOBILITY RELATED FACTORS  [ ] Age 41-60 years                                            (1 Point)                  [ ] Bed rest                                                        (1 Point)  [x] Age: 61-74 years                                           (2 Points)                 [ ] Plaster cast                                                   (2 Points)  [ ] Age= 75 years                                              (3 Points)                   [ ] Bed bound for more than 72 hours                 (2 Points)    DISEASE RELATED RISK FACTORS                                               GENDER SPECIFIC FACTORS  [ ] Edema in the lower extremities                       (1 Point)              [ ] Pregnancy                                                     (1 Point)  [ x] Varicose veins                                               (1 Point)                     [ ] Post-partum < 6 weeks                                   (1 Point)             [x ] BMI > 25 Kg/m2                                            (1 Point)                     [ ] Hormonal therapy  or oral contraception          (1 Point)                 [ ] Sepsis (in the previous month)                        (1 Point)                [ ] History of pregnancy complications                 (1 point)  [ ] Pneumonia or serious lung disease                                                [ ] Unexplained or recurrent                     (1 Point)           (in the previous month)                               (1 Point)  [ ] Abnormal pulmonary function test                     (1 Point)                 SURGERY RELATED RISK FACTORS  [ ] Acute myocardial infarction                              (1 Point)                   [ ]  Section                                             (1 Point)  [ ] Congestive heart failure (in the previous month)  (1 Point)           [ ] Minor surgery                                                  (1 Point)   [ ] Inflammatory bowel disease                             (1 Point)                   [ ] Arthroscopic surgery                                        (2 Points)  [ ] Central venous access                                      (2 Points)                    [ x] General surgery lasting more than 45 minutes   (2 Points)       [ ] Stroke (in the previous month)                          (5 Points)                 [ ] Elective arthroplasty                                         (5 Points)            [ ] Malignacy (past or present)                          (2 ponits)                                                                                                                            HEMATOLOGY RELATED FACTORS                                                 TRAUMA RELATED RISK FACTORS  [ ] Prior episodes of VTE                                     (3 Points)                [ ] Fracture of the hip, pelvis, or leg                       (5 Points)  [ ] Positive family history for VTE                         (3 Points)             [ ] Acute spinal cord injury (in the previous month)  (5 Points)  [ ] Prothrombin 07540 A                                     (3 Points)                [ ] Paralysis  (less than 1 month)                             (5 Points)  [ ] Factor V Leiden                                             (3 Points)                   [ ] Multiple Trauma within 1 month                        (5 Points)  [ ] Lupus anticoagulants                                     (3 Points)                                                           [ ] Anticardiolipin antibodies                               (3 Points)                                                       [ ] High homocysteine in the blood                      (3 Points)                                             [ ] Other congenital or acquired thrombophilia      (3 Points)                                                [ ] Heparin induced thrombocytopenia                  (3 Points)                                          Total Score [      6   ]    Caprini Score 0 - 2:  Low Risk, No VTE Prophylaxis required for most patients, encourage ambulation  Caprini Score 3 - 6:  At Risk, pharmacologic VTE prophylaxis is indicated for most patients (in the absence of a contraindication)  Caprini Score Greater than or = 7:  High Risk, pharmacologic VTE prophylaxis is indicated for most patients (in the absence of a contraindication)      71 year old female present today for PST. She reports DR. Loredo her cardiologist who monitor closely her MVP. Patient reports on recent PARVEEN it was noted with worsen mitral regurgitation and ruptured chordae. She is now schedule for Mitral valve repair , possible replacement with Dr. Rossi on 21   Patient educated on written and verbal preop instructions.   medications reviewed, instructions given on what medications to take and what not to take.  Pt instructed to stop Herbals or anti-inflammatory meds one week prior to surgery and discuss with PMD.

## 2021-05-24 NOTE — H&P PST ADULT - NSICDXFAMILYHX_GEN_ALL_CORE_FT
FAMILY HISTORY:  Father  Still living? Unknown  FH: heart attack, Age at diagnosis: Age Unknown    Mother  Still living? Unknown  FH: hypertension, Age at diagnosis: Age Unknown

## 2021-05-24 NOTE — H&P PST ADULT - NSICDXPROBLEM_GEN_ALL_CORE_FT
PROBLEM DIAGNOSES  Problem: HTN (hypertension)  Assessment and Plan: routine labs and ekg  Asked the patient to take the Blood pressure medication/ heart medication on DOP.      Problem: Mitral regurgitation  Assessment and Plan:  Mitral valve repair Vs, possible replacement with Dr. Rossi on 6/1/21       Problem: Need for prophylactic measure  Assessment and Plan: Moderate Risk, surgical team should consider VTE prophylaxis      Problem: HLD (hyperlipidemia)  Assessment and Plan: continue medication as directed

## 2021-05-25 ENCOUNTER — OUTPATIENT (OUTPATIENT)
Dept: OUTPATIENT SERVICES | Facility: HOSPITAL | Age: 71
LOS: 1 days | End: 2021-05-25
Payer: MEDICARE

## 2021-05-25 DIAGNOSIS — Z98.890 OTHER SPECIFIED POSTPROCEDURAL STATES: Chronic | ICD-10-CM

## 2021-05-25 DIAGNOSIS — Z98.51 TUBAL LIGATION STATUS: Chronic | ICD-10-CM

## 2021-05-25 DIAGNOSIS — Z01.812 ENCOUNTER FOR PREPROCEDURAL LABORATORY EXAMINATION: ICD-10-CM

## 2021-05-25 LAB
CULTURE RESULTS: SIGNIFICANT CHANGE UP
MRSA PCR RESULT.: SIGNIFICANT CHANGE UP
S AUREUS DNA NOSE QL NAA+PROBE: SIGNIFICANT CHANGE UP
SPECIMEN SOURCE: SIGNIFICANT CHANGE UP

## 2021-05-25 PROCEDURE — 87640 STAPH A DNA AMP PROBE: CPT

## 2021-05-25 PROCEDURE — 87641 MR-STAPH DNA AMP PROBE: CPT

## 2021-05-29 ENCOUNTER — APPOINTMENT (OUTPATIENT)
Dept: DISASTER EMERGENCY | Facility: CLINIC | Age: 71
End: 2021-05-29

## 2021-05-30 LAB — SARS-COV-2 N GENE NPH QL NAA+PROBE: NOT DETECTED

## 2021-06-07 ENCOUNTER — APPOINTMENT (OUTPATIENT)
Dept: DISASTER EMERGENCY | Facility: CLINIC | Age: 71
End: 2021-06-07

## 2021-06-08 LAB — SARS-COV-2 N GENE NPH QL NAA+PROBE: NOT DETECTED

## 2021-06-10 ENCOUNTER — RESULT REVIEW (OUTPATIENT)
Age: 71
End: 2021-06-10

## 2021-06-10 ENCOUNTER — INPATIENT (INPATIENT)
Facility: HOSPITAL | Age: 71
LOS: 4 days | Discharge: HOME | DRG: 219 | End: 2021-06-15
Attending: THORACIC SURGERY (CARDIOTHORACIC VASCULAR SURGERY) | Admitting: THORACIC SURGERY (CARDIOTHORACIC VASCULAR SURGERY)
Payer: MEDICARE

## 2021-06-10 ENCOUNTER — APPOINTMENT (OUTPATIENT)
Dept: CARDIOTHORACIC SURGERY | Facility: HOSPITAL | Age: 71
End: 2021-06-10

## 2021-06-10 VITALS
TEMPERATURE: 98 F | RESPIRATION RATE: 16 BRPM | HEART RATE: 68 BPM | SYSTOLIC BLOOD PRESSURE: 96 MMHG | WEIGHT: 130.07 LBS | OXYGEN SATURATION: 99 % | DIASTOLIC BLOOD PRESSURE: 57 MMHG | HEIGHT: 61 IN

## 2021-06-10 DIAGNOSIS — Z98.890 OTHER SPECIFIED POSTPROCEDURAL STATES: Chronic | ICD-10-CM

## 2021-06-10 DIAGNOSIS — I34.0 NONRHEUMATIC MITRAL (VALVE) INSUFFICIENCY: ICD-10-CM

## 2021-06-10 DIAGNOSIS — Z98.51 TUBAL LIGATION STATUS: Chronic | ICD-10-CM

## 2021-06-10 LAB
ALBUMIN SERPL ELPH-MCNC: 3.1 G/DL — LOW (ref 3.3–5.2)
ALP SERPL-CCNC: 27 U/L — LOW (ref 40–120)
ALT FLD-CCNC: 11 U/L — SIGNIFICANT CHANGE UP
ANION GAP SERPL CALC-SCNC: 8 MMOL/L — SIGNIFICANT CHANGE UP (ref 5–17)
APTT BLD: 34.6 SEC — SIGNIFICANT CHANGE UP (ref 27.5–35.5)
AST SERPL-CCNC: 28 U/L — SIGNIFICANT CHANGE UP
BILIRUB SERPL-MCNC: 1.2 MG/DL — SIGNIFICANT CHANGE UP (ref 0.4–2)
BLD GP AB SCN SERPL QL: SIGNIFICANT CHANGE UP
BUN SERPL-MCNC: 16 MG/DL — SIGNIFICANT CHANGE UP (ref 8–20)
CALCIUM SERPL-MCNC: 7.9 MG/DL — LOW (ref 8.6–10.2)
CHLORIDE SERPL-SCNC: 108 MMOL/L — HIGH (ref 98–107)
CK MB CFR SERPL CALC: 33.3 NG/ML — HIGH (ref 0–6.7)
CK SERPL-CCNC: 257 U/L — HIGH (ref 25–170)
CO2 SERPL-SCNC: 26 MMOL/L — SIGNIFICANT CHANGE UP (ref 22–29)
CREAT SERPL-MCNC: 0.43 MG/DL — LOW (ref 0.5–1.3)
GAS PNL BLDA: SIGNIFICANT CHANGE UP
GLUCOSE BLDC GLUCOMTR-MCNC: 116 MG/DL — HIGH (ref 70–99)
GLUCOSE BLDC GLUCOMTR-MCNC: 125 MG/DL — HIGH (ref 70–99)
GLUCOSE BLDC GLUCOMTR-MCNC: 140 MG/DL — HIGH (ref 70–99)
GLUCOSE BLDC GLUCOMTR-MCNC: 146 MG/DL — HIGH (ref 70–99)
GLUCOSE BLDC GLUCOMTR-MCNC: 169 MG/DL — HIGH (ref 70–99)
GLUCOSE BLDC GLUCOMTR-MCNC: 97 MG/DL — SIGNIFICANT CHANGE UP (ref 70–99)
GLUCOSE SERPL-MCNC: 190 MG/DL — HIGH (ref 70–99)
HCT VFR BLD CALC: 29.9 % — LOW (ref 34.5–45)
HGB BLD-MCNC: 9.7 G/DL — LOW (ref 11.5–15.5)
INR BLD: 1.25 RATIO — HIGH (ref 0.88–1.16)
MAGNESIUM SERPL-MCNC: 2.3 MG/DL — SIGNIFICANT CHANGE UP (ref 1.6–2.6)
MCHC RBC-ENTMCNC: 29.8 PG — SIGNIFICANT CHANGE UP (ref 27–34)
MCHC RBC-ENTMCNC: 32.4 GM/DL — SIGNIFICANT CHANGE UP (ref 32–36)
MCV RBC AUTO: 91.7 FL — SIGNIFICANT CHANGE UP (ref 80–100)
PLATELET # BLD AUTO: 122 K/UL — LOW (ref 150–400)
POTASSIUM SERPL-MCNC: 3.9 MMOL/L — SIGNIFICANT CHANGE UP (ref 3.5–5.3)
POTASSIUM SERPL-SCNC: 3.9 MMOL/L — SIGNIFICANT CHANGE UP (ref 3.5–5.3)
PROT SERPL-MCNC: 4.5 G/DL — LOW (ref 6.6–8.7)
PROTHROM AB SERPL-ACNC: 14.3 SEC — HIGH (ref 10.6–13.6)
RBC # BLD: 3.26 M/UL — LOW (ref 3.8–5.2)
RBC # FLD: 13.6 % — SIGNIFICANT CHANGE UP (ref 10.3–14.5)
SODIUM SERPL-SCNC: 142 MMOL/L — SIGNIFICANT CHANGE UP (ref 135–145)
TROPONIN T SERPL-MCNC: 0.4 NG/ML — HIGH (ref 0–0.06)
WBC # BLD: 12.81 K/UL — HIGH (ref 3.8–10.5)
WBC # FLD AUTO: 12.81 K/UL — HIGH (ref 3.8–10.5)

## 2021-06-10 PROCEDURE — 93325 DOPPLER ECHO COLOR FLOW MAPG: CPT | Mod: 26

## 2021-06-10 PROCEDURE — 93010 ELECTROCARDIOGRAM REPORT: CPT

## 2021-06-10 PROCEDURE — 93312 ECHO TRANSESOPHAGEAL: CPT | Mod: 26

## 2021-06-10 PROCEDURE — 93320 DOPPLER ECHO COMPLETE: CPT | Mod: 26

## 2021-06-10 PROCEDURE — 33427 REPAIR OF MITRAL VALVE: CPT

## 2021-06-10 PROCEDURE — 88305 TISSUE EXAM BY PATHOLOGIST: CPT | Mod: 26

## 2021-06-10 PROCEDURE — 76376 3D RENDER W/INTRP POSTPROCES: CPT | Mod: 26

## 2021-06-10 PROCEDURE — 71045 X-RAY EXAM CHEST 1 VIEW: CPT | Mod: 26

## 2021-06-10 PROCEDURE — 33427 REPAIR OF MITRAL VALVE: CPT | Mod: AS

## 2021-06-10 RX ORDER — SODIUM CHLORIDE 9 MG/ML
3 INJECTION INTRAMUSCULAR; INTRAVENOUS; SUBCUTANEOUS EVERY 8 HOURS
Refills: 0 | Status: DISCONTINUED | OUTPATIENT
Start: 2021-06-10 | End: 2021-06-10

## 2021-06-10 RX ORDER — FENTANYL CITRATE 50 UG/ML
200 INJECTION INTRAVENOUS ONCE
Refills: 0 | Status: DISCONTINUED | OUTPATIENT
Start: 2021-06-10 | End: 2021-06-10

## 2021-06-10 RX ORDER — CALCIUM GLUCONATE 100 MG/ML
2 VIAL (ML) INTRAVENOUS ONCE
Refills: 0 | Status: COMPLETED | OUTPATIENT
Start: 2021-06-10 | End: 2021-06-10

## 2021-06-10 RX ORDER — POTASSIUM CHLORIDE 20 MEQ
10 PACKET (EA) ORAL
Refills: 0 | Status: COMPLETED | OUTPATIENT
Start: 2021-06-10 | End: 2021-06-10

## 2021-06-10 RX ORDER — DEXTROSE 50 % IN WATER 50 %
50 SYRINGE (ML) INTRAVENOUS
Refills: 0 | Status: DISCONTINUED | OUTPATIENT
Start: 2021-06-10 | End: 2021-06-11

## 2021-06-10 RX ORDER — VANCOMYCIN HCL 1 G
1000 VIAL (EA) INTRAVENOUS EVERY 12 HOURS
Refills: 0 | Status: DISCONTINUED | OUTPATIENT
Start: 2021-06-10 | End: 2021-06-11

## 2021-06-10 RX ORDER — CEFUROXIME AXETIL 250 MG
1500 TABLET ORAL ONCE
Refills: 0 | Status: DISCONTINUED | OUTPATIENT
Start: 2021-06-10 | End: 2021-06-10

## 2021-06-10 RX ORDER — CEFUROXIME AXETIL 250 MG
1500 TABLET ORAL EVERY 8 HOURS
Refills: 0 | Status: COMPLETED | OUTPATIENT
Start: 2021-06-10 | End: 2021-06-12

## 2021-06-10 RX ORDER — PANTOPRAZOLE SODIUM 20 MG/1
40 TABLET, DELAYED RELEASE ORAL ONCE
Refills: 0 | Status: COMPLETED | OUTPATIENT
Start: 2021-06-10 | End: 2021-06-10

## 2021-06-10 RX ORDER — POTASSIUM CHLORIDE 20 MEQ
10 PACKET (EA) ORAL
Refills: 0 | Status: DISCONTINUED | OUTPATIENT
Start: 2021-06-10 | End: 2021-06-10

## 2021-06-10 RX ORDER — PROPOFOL 10 MG/ML
5 INJECTION, EMULSION INTRAVENOUS
Qty: 1000 | Refills: 0 | Status: DISCONTINUED | OUTPATIENT
Start: 2021-06-10 | End: 2021-06-10

## 2021-06-10 RX ORDER — NOREPINEPHRINE BITARTRATE/D5W 8 MG/250ML
0.05 PLASTIC BAG, INJECTION (ML) INTRAVENOUS
Qty: 8 | Refills: 0 | Status: DISCONTINUED | OUTPATIENT
Start: 2021-06-10 | End: 2021-06-11

## 2021-06-10 RX ORDER — CHLORHEXIDINE GLUCONATE 213 G/1000ML
15 SOLUTION TOPICAL EVERY 12 HOURS
Refills: 0 | Status: DISCONTINUED | OUTPATIENT
Start: 2021-06-10 | End: 2021-06-10

## 2021-06-10 RX ORDER — MEPERIDINE HYDROCHLORIDE 50 MG/ML
25 INJECTION INTRAMUSCULAR; INTRAVENOUS; SUBCUTANEOUS ONCE
Refills: 0 | Status: DISCONTINUED | OUTPATIENT
Start: 2021-06-10 | End: 2021-06-10

## 2021-06-10 RX ORDER — ONDANSETRON 8 MG/1
4 TABLET, FILM COATED ORAL EVERY 6 HOURS
Refills: 0 | Status: DISCONTINUED | OUTPATIENT
Start: 2021-06-10 | End: 2021-06-14

## 2021-06-10 RX ORDER — FENTANYL CITRATE 50 UG/ML
50 INJECTION INTRAVENOUS
Refills: 0 | Status: DISCONTINUED | OUTPATIENT
Start: 2021-06-10 | End: 2021-06-10

## 2021-06-10 RX ORDER — ACETAMINOPHEN 500 MG
1000 TABLET ORAL ONCE
Refills: 0 | Status: COMPLETED | OUTPATIENT
Start: 2021-06-10 | End: 2021-06-10

## 2021-06-10 RX ORDER — SODIUM CHLORIDE 9 MG/ML
1000 INJECTION INTRAMUSCULAR; INTRAVENOUS; SUBCUTANEOUS
Refills: 0 | Status: DISCONTINUED | OUTPATIENT
Start: 2021-06-10 | End: 2021-06-12

## 2021-06-10 RX ORDER — PANTOPRAZOLE SODIUM 20 MG/1
40 TABLET, DELAYED RELEASE ORAL DAILY
Refills: 0 | Status: DISCONTINUED | OUTPATIENT
Start: 2021-06-11 | End: 2021-06-15

## 2021-06-10 RX ORDER — ASPIRIN/CALCIUM CARB/MAGNESIUM 324 MG
81 TABLET ORAL DAILY
Refills: 0 | Status: DISCONTINUED | OUTPATIENT
Start: 2021-06-10 | End: 2021-06-15

## 2021-06-10 RX ORDER — VECURONIUM BROMIDE 20 MG/1
10 INJECTION, POWDER, FOR SOLUTION INTRAVENOUS ONCE
Refills: 0 | Status: COMPLETED | OUTPATIENT
Start: 2021-06-10 | End: 2021-06-10

## 2021-06-10 RX ORDER — PSYLLIUM SEED (WITH DEXTROSE)
1 POWDER (GRAM) ORAL THREE TIMES A DAY
Refills: 0 | Status: DISCONTINUED | OUTPATIENT
Start: 2021-06-10 | End: 2021-06-15

## 2021-06-10 RX ORDER — CHLORHEXIDINE GLUCONATE 213 G/1000ML
5 SOLUTION TOPICAL
Refills: 0 | Status: DISCONTINUED | OUTPATIENT
Start: 2021-06-10 | End: 2021-06-12

## 2021-06-10 RX ORDER — SENNA PLUS 8.6 MG/1
2 TABLET ORAL AT BEDTIME
Refills: 0 | Status: DISCONTINUED | OUTPATIENT
Start: 2021-06-10 | End: 2021-06-15

## 2021-06-10 RX ORDER — ATORVASTATIN CALCIUM 80 MG/1
20 TABLET, FILM COATED ORAL AT BEDTIME
Refills: 0 | Status: DISCONTINUED | OUTPATIENT
Start: 2021-06-10 | End: 2021-06-15

## 2021-06-10 RX ORDER — INSULIN HUMAN 100 [IU]/ML
2 INJECTION, SOLUTION SUBCUTANEOUS
Qty: 50 | Refills: 0 | Status: DISCONTINUED | OUTPATIENT
Start: 2021-06-10 | End: 2021-06-11

## 2021-06-10 RX ORDER — DEXTROSE 50 % IN WATER 50 %
25 SYRINGE (ML) INTRAVENOUS
Refills: 0 | Status: DISCONTINUED | OUTPATIENT
Start: 2021-06-10 | End: 2021-06-11

## 2021-06-10 RX ADMIN — Medication 100 MILLIEQUIVALENT(S): at 14:06

## 2021-06-10 RX ADMIN — Medication 100 MILLIEQUIVALENT(S): at 18:59

## 2021-06-10 RX ADMIN — Medication 81 MILLIGRAM(S): at 20:34

## 2021-06-10 RX ADMIN — FENTANYL CITRATE 50 MICROGRAM(S): 50 INJECTION INTRAVENOUS at 13:00

## 2021-06-10 RX ADMIN — VECURONIUM BROMIDE 10 MILLIGRAM(S): 20 INJECTION, POWDER, FOR SOLUTION INTRAVENOUS at 14:00

## 2021-06-10 RX ADMIN — Medication 400 MILLIGRAM(S): at 23:24

## 2021-06-10 RX ADMIN — INSULIN HUMAN 2 UNIT(S)/HR: 100 INJECTION, SOLUTION SUBCUTANEOUS at 12:33

## 2021-06-10 RX ADMIN — ONDANSETRON 4 MILLIGRAM(S): 8 TABLET, FILM COATED ORAL at 23:24

## 2021-06-10 RX ADMIN — Medication 250 MILLIGRAM(S): at 20:34

## 2021-06-10 RX ADMIN — Medication 100 MILLIEQUIVALENT(S): at 13:00

## 2021-06-10 RX ADMIN — PROPOFOL 1.77 MICROGRAM(S)/KG/MIN: 10 INJECTION, EMULSION INTRAVENOUS at 12:31

## 2021-06-10 RX ADMIN — Medication 1000 MILLIGRAM(S): at 17:23

## 2021-06-10 RX ADMIN — Medication 200 GRAM(S): at 13:03

## 2021-06-10 RX ADMIN — FENTANYL CITRATE 200 MICROGRAM(S): 50 INJECTION INTRAVENOUS at 14:00

## 2021-06-10 RX ADMIN — PANTOPRAZOLE SODIUM 40 MILLIGRAM(S): 20 TABLET, DELAYED RELEASE ORAL at 12:31

## 2021-06-10 RX ADMIN — Medication 100 MILLIEQUIVALENT(S): at 18:00

## 2021-06-10 RX ADMIN — Medication 100 MILLIEQUIVALENT(S): at 12:00

## 2021-06-10 RX ADMIN — Medication 400 MILLIGRAM(S): at 16:53

## 2021-06-10 RX ADMIN — FENTANYL CITRATE 50 MICROGRAM(S): 50 INJECTION INTRAVENOUS at 12:30

## 2021-06-10 RX ADMIN — Medication 1 PACKET(S): at 23:24

## 2021-06-10 RX ADMIN — Medication 100 MILLIGRAM(S): at 16:15

## 2021-06-10 RX ADMIN — FENTANYL CITRATE 200 MICROGRAM(S): 50 INJECTION INTRAVENOUS at 14:30

## 2021-06-11 DIAGNOSIS — E78.5 HYPERLIPIDEMIA, UNSPECIFIED: ICD-10-CM

## 2021-06-11 DIAGNOSIS — Z29.9 ENCOUNTER FOR PROPHYLACTIC MEASURES, UNSPECIFIED: ICD-10-CM

## 2021-06-11 DIAGNOSIS — I34.0 NONRHEUMATIC MITRAL (VALVE) INSUFFICIENCY: ICD-10-CM

## 2021-06-11 LAB
ALBUMIN SERPL ELPH-MCNC: 3.3 G/DL — SIGNIFICANT CHANGE UP (ref 3.3–5.2)
ALP SERPL-CCNC: 29 U/L — LOW (ref 40–120)
ALT FLD-CCNC: 14 U/L — SIGNIFICANT CHANGE UP
ANION GAP SERPL CALC-SCNC: 7 MMOL/L — SIGNIFICANT CHANGE UP (ref 5–17)
AST SERPL-CCNC: 28 U/L — SIGNIFICANT CHANGE UP
BILIRUB SERPL-MCNC: 1.3 MG/DL — SIGNIFICANT CHANGE UP (ref 0.4–2)
BUN SERPL-MCNC: 12.2 MG/DL — SIGNIFICANT CHANGE UP (ref 8–20)
CALCIUM SERPL-MCNC: 8 MG/DL — LOW (ref 8.6–10.2)
CHLORIDE SERPL-SCNC: 109 MMOL/L — HIGH (ref 98–107)
CO2 SERPL-SCNC: 25 MMOL/L — SIGNIFICANT CHANGE UP (ref 22–29)
COVID-19 SPIKE DOMAIN AB INTERP: POSITIVE
COVID-19 SPIKE DOMAIN ANTIBODY RESULT: >250 U/ML — HIGH
CREAT SERPL-MCNC: 0.44 MG/DL — LOW (ref 0.5–1.3)
GLUCOSE BLDC GLUCOMTR-MCNC: 101 MG/DL — HIGH (ref 70–99)
GLUCOSE BLDC GLUCOMTR-MCNC: 106 MG/DL — HIGH (ref 70–99)
GLUCOSE BLDC GLUCOMTR-MCNC: 110 MG/DL — HIGH (ref 70–99)
GLUCOSE BLDC GLUCOMTR-MCNC: 115 MG/DL — HIGH (ref 70–99)
GLUCOSE BLDC GLUCOMTR-MCNC: 119 MG/DL — HIGH (ref 70–99)
GLUCOSE BLDC GLUCOMTR-MCNC: 123 MG/DL — HIGH (ref 70–99)
GLUCOSE BLDC GLUCOMTR-MCNC: 126 MG/DL — HIGH (ref 70–99)
GLUCOSE BLDC GLUCOMTR-MCNC: 127 MG/DL — HIGH (ref 70–99)
GLUCOSE BLDC GLUCOMTR-MCNC: 134 MG/DL — HIGH (ref 70–99)
GLUCOSE SERPL-MCNC: 130 MG/DL — HIGH (ref 70–99)
HCT VFR BLD CALC: 31.2 % — LOW (ref 34.5–45)
HGB BLD-MCNC: 9.9 G/DL — LOW (ref 11.5–15.5)
MAGNESIUM SERPL-MCNC: 2.1 MG/DL — SIGNIFICANT CHANGE UP (ref 1.6–2.6)
MCHC RBC-ENTMCNC: 29.6 PG — SIGNIFICANT CHANGE UP (ref 27–34)
MCHC RBC-ENTMCNC: 31.7 GM/DL — LOW (ref 32–36)
MCV RBC AUTO: 93.4 FL — SIGNIFICANT CHANGE UP (ref 80–100)
PLATELET # BLD AUTO: 116 K/UL — LOW (ref 150–400)
POTASSIUM SERPL-MCNC: 4.3 MMOL/L — SIGNIFICANT CHANGE UP (ref 3.5–5.3)
POTASSIUM SERPL-SCNC: 4.3 MMOL/L — SIGNIFICANT CHANGE UP (ref 3.5–5.3)
PROT SERPL-MCNC: 5 G/DL — LOW (ref 6.6–8.7)
RBC # BLD: 3.34 M/UL — LOW (ref 3.8–5.2)
RBC # FLD: 13.5 % — SIGNIFICANT CHANGE UP (ref 10.3–14.5)
SARS-COV-2 IGG+IGM SERPL QL IA: >250 U/ML — HIGH
SARS-COV-2 IGG+IGM SERPL QL IA: POSITIVE
SODIUM SERPL-SCNC: 141 MMOL/L — SIGNIFICANT CHANGE UP (ref 135–145)
SURGICAL PATHOLOGY STUDY: SIGNIFICANT CHANGE UP
TROPONIN T SERPL-MCNC: 0.23 NG/ML — HIGH (ref 0–0.06)
WBC # BLD: 7.57 K/UL — SIGNIFICANT CHANGE UP (ref 3.8–10.5)
WBC # FLD AUTO: 7.57 K/UL — SIGNIFICANT CHANGE UP (ref 3.8–10.5)

## 2021-06-11 PROCEDURE — 93010 ELECTROCARDIOGRAM REPORT: CPT

## 2021-06-11 PROCEDURE — 71045 X-RAY EXAM CHEST 1 VIEW: CPT | Mod: 26

## 2021-06-11 RX ORDER — ENOXAPARIN SODIUM 100 MG/ML
40 INJECTION SUBCUTANEOUS DAILY
Refills: 0 | Status: DISCONTINUED | OUTPATIENT
Start: 2021-06-11 | End: 2021-06-12

## 2021-06-11 RX ORDER — DEXTROSE 50 % IN WATER 50 %
25 SYRINGE (ML) INTRAVENOUS ONCE
Refills: 0 | Status: DISCONTINUED | OUTPATIENT
Start: 2021-06-11 | End: 2021-06-12

## 2021-06-11 RX ORDER — DEXTROSE 50 % IN WATER 50 %
12.5 SYRINGE (ML) INTRAVENOUS ONCE
Refills: 0 | Status: DISCONTINUED | OUTPATIENT
Start: 2021-06-11 | End: 2021-06-12

## 2021-06-11 RX ORDER — DEXTROSE 50 % IN WATER 50 %
15 SYRINGE (ML) INTRAVENOUS ONCE
Refills: 0 | Status: DISCONTINUED | OUTPATIENT
Start: 2021-06-11 | End: 2021-06-12

## 2021-06-11 RX ORDER — KETOROLAC TROMETHAMINE 30 MG/ML
15 SYRINGE (ML) INJECTION ONCE
Refills: 0 | Status: DISCONTINUED | OUTPATIENT
Start: 2021-06-11 | End: 2021-06-11

## 2021-06-11 RX ORDER — GLUCAGON INJECTION, SOLUTION 0.5 MG/.1ML
1 INJECTION, SOLUTION SUBCUTANEOUS ONCE
Refills: 0 | Status: DISCONTINUED | OUTPATIENT
Start: 2021-06-11 | End: 2021-06-12

## 2021-06-11 RX ORDER — SODIUM CHLORIDE 9 MG/ML
1000 INJECTION, SOLUTION INTRAVENOUS
Refills: 0 | Status: DISCONTINUED | OUTPATIENT
Start: 2021-06-11 | End: 2021-06-12

## 2021-06-11 RX ORDER — OXYCODONE HYDROCHLORIDE 5 MG/1
5 TABLET ORAL EVERY 4 HOURS
Refills: 0 | Status: DISCONTINUED | OUTPATIENT
Start: 2021-06-11 | End: 2021-06-15

## 2021-06-11 RX ORDER — ALBUMIN HUMAN 25 %
250 VIAL (ML) INTRAVENOUS ONCE
Refills: 0 | Status: COMPLETED | OUTPATIENT
Start: 2021-06-11 | End: 2021-06-11

## 2021-06-11 RX ORDER — VANCOMYCIN HCL 1 G
750 VIAL (EA) INTRAVENOUS EVERY 12 HOURS
Refills: 0 | Status: COMPLETED | OUTPATIENT
Start: 2021-06-11 | End: 2021-06-12

## 2021-06-11 RX ORDER — KETOROLAC TROMETHAMINE 30 MG/ML
30 SYRINGE (ML) INJECTION ONCE
Refills: 0 | Status: DISCONTINUED | OUTPATIENT
Start: 2021-06-11 | End: 2021-06-11

## 2021-06-11 RX ORDER — HYDROMORPHONE HYDROCHLORIDE 2 MG/ML
0.25 INJECTION INTRAMUSCULAR; INTRAVENOUS; SUBCUTANEOUS ONCE
Refills: 0 | Status: DISCONTINUED | OUTPATIENT
Start: 2021-06-11 | End: 2021-06-11

## 2021-06-11 RX ORDER — INSULIN LISPRO 100/ML
VIAL (ML) SUBCUTANEOUS
Refills: 0 | Status: DISCONTINUED | OUTPATIENT
Start: 2021-06-11 | End: 2021-06-13

## 2021-06-11 RX ORDER — LIDOCAINE 4 G/100G
1 CREAM TOPICAL EVERY 24 HOURS
Refills: 0 | Status: DISCONTINUED | OUTPATIENT
Start: 2021-06-11 | End: 2021-06-15

## 2021-06-11 RX ORDER — OXYCODONE HYDROCHLORIDE 5 MG/1
10 TABLET ORAL EVERY 4 HOURS
Refills: 0 | Status: DISCONTINUED | OUTPATIENT
Start: 2021-06-11 | End: 2021-06-15

## 2021-06-11 RX ORDER — ACETAMINOPHEN 500 MG
1000 TABLET ORAL ONCE
Refills: 0 | Status: COMPLETED | OUTPATIENT
Start: 2021-06-11 | End: 2021-06-11

## 2021-06-11 RX ORDER — ACETAMINOPHEN 500 MG
650 TABLET ORAL EVERY 6 HOURS
Refills: 0 | Status: DISCONTINUED | OUTPATIENT
Start: 2021-06-11 | End: 2021-06-15

## 2021-06-11 RX ADMIN — Medication 15 MILLIGRAM(S): at 18:10

## 2021-06-11 RX ADMIN — Medication 650 MILLIGRAM(S): at 15:00

## 2021-06-11 RX ADMIN — Medication 100 MILLIGRAM(S): at 00:42

## 2021-06-11 RX ADMIN — Medication 250 MILLIGRAM(S): at 07:43

## 2021-06-11 RX ADMIN — Medication 125 MILLILITER(S): at 05:52

## 2021-06-11 RX ADMIN — Medication 1000 MILLIGRAM(S): at 00:28

## 2021-06-11 RX ADMIN — ONDANSETRON 4 MILLIGRAM(S): 8 TABLET, FILM COATED ORAL at 15:00

## 2021-06-11 RX ADMIN — LIDOCAINE 1 PATCH: 4 CREAM TOPICAL at 23:04

## 2021-06-11 RX ADMIN — Medication 125 MILLILITER(S): at 09:25

## 2021-06-11 RX ADMIN — ATORVASTATIN CALCIUM 20 MILLIGRAM(S): 80 TABLET, FILM COATED ORAL at 00:06

## 2021-06-11 RX ADMIN — OXYCODONE HYDROCHLORIDE 5 MILLIGRAM(S): 5 TABLET ORAL at 22:00

## 2021-06-11 RX ADMIN — ONDANSETRON 4 MILLIGRAM(S): 8 TABLET, FILM COATED ORAL at 20:07

## 2021-06-11 RX ADMIN — HYDROMORPHONE HYDROCHLORIDE 0.25 MILLIGRAM(S): 2 INJECTION INTRAMUSCULAR; INTRAVENOUS; SUBCUTANEOUS at 15:55

## 2021-06-11 RX ADMIN — ENOXAPARIN SODIUM 40 MILLIGRAM(S): 100 INJECTION SUBCUTANEOUS at 11:11

## 2021-06-11 RX ADMIN — Medication 15 MILLIGRAM(S): at 18:25

## 2021-06-11 RX ADMIN — Medication 400 MILLIGRAM(S): at 07:00

## 2021-06-11 RX ADMIN — Medication 125 MILLILITER(S): at 23:06

## 2021-06-11 RX ADMIN — LIDOCAINE 1 PATCH: 4 CREAM TOPICAL at 11:08

## 2021-06-11 RX ADMIN — Medication 81 MILLIGRAM(S): at 11:08

## 2021-06-11 RX ADMIN — Medication 100 MILLIGRAM(S): at 16:16

## 2021-06-11 RX ADMIN — PANTOPRAZOLE SODIUM 40 MILLIGRAM(S): 20 TABLET, DELAYED RELEASE ORAL at 11:08

## 2021-06-11 RX ADMIN — Medication 30 MILLIGRAM(S): at 10:30

## 2021-06-11 RX ADMIN — Medication 30 MILLIGRAM(S): at 10:15

## 2021-06-11 RX ADMIN — SENNA PLUS 2 TABLET(S): 8.6 TABLET ORAL at 00:06

## 2021-06-11 RX ADMIN — OXYCODONE HYDROCHLORIDE 5 MILLIGRAM(S): 5 TABLET ORAL at 20:07

## 2021-06-11 RX ADMIN — HYDROMORPHONE HYDROCHLORIDE 0.25 MILLIGRAM(S): 2 INJECTION INTRAMUSCULAR; INTRAVENOUS; SUBCUTANEOUS at 16:10

## 2021-06-11 RX ADMIN — Medication 100 MILLIGRAM(S): at 09:24

## 2021-06-11 RX ADMIN — Medication 650 MILLIGRAM(S): at 16:00

## 2021-06-11 RX ADMIN — Medication 250 MILLIGRAM(S): at 20:06

## 2021-06-11 RX ADMIN — ONDANSETRON 4 MILLIGRAM(S): 8 TABLET, FILM COATED ORAL at 04:54

## 2021-06-11 RX ADMIN — Medication 1000 MILLIGRAM(S): at 07:30

## 2021-06-11 NOTE — PHYSICAL THERAPY INITIAL EVALUATION ADULT - ADDITIONAL COMMENTS
Pt. states she lives in a private home with 4 steps to enter and full flight to the bedroom with handrail.  Daughter will be available to assist.

## 2021-06-12 LAB
ANION GAP SERPL CALC-SCNC: 5 MMOL/L — SIGNIFICANT CHANGE UP (ref 5–17)
BUN SERPL-MCNC: 14.5 MG/DL — SIGNIFICANT CHANGE UP (ref 8–20)
CALCIUM SERPL-MCNC: 8.1 MG/DL — LOW (ref 8.6–10.2)
CHLORIDE SERPL-SCNC: 107 MMOL/L — SIGNIFICANT CHANGE UP (ref 98–107)
CO2 SERPL-SCNC: 26 MMOL/L — SIGNIFICANT CHANGE UP (ref 22–29)
CREAT SERPL-MCNC: 0.42 MG/DL — LOW (ref 0.5–1.3)
GLUCOSE BLDC GLUCOMTR-MCNC: 100 MG/DL — HIGH (ref 70–99)
GLUCOSE BLDC GLUCOMTR-MCNC: 119 MG/DL — HIGH (ref 70–99)
GLUCOSE BLDC GLUCOMTR-MCNC: 92 MG/DL — SIGNIFICANT CHANGE UP (ref 70–99)
GLUCOSE BLDC GLUCOMTR-MCNC: 94 MG/DL — SIGNIFICANT CHANGE UP (ref 70–99)
GLUCOSE SERPL-MCNC: 109 MG/DL — HIGH (ref 70–99)
HCT VFR BLD CALC: 25.8 % — LOW (ref 34.5–45)
HGB BLD-MCNC: 8.1 G/DL — LOW (ref 11.5–15.5)
MAGNESIUM SERPL-MCNC: 2 MG/DL — SIGNIFICANT CHANGE UP (ref 1.6–2.6)
MCHC RBC-ENTMCNC: 29.7 PG — SIGNIFICANT CHANGE UP (ref 27–34)
MCHC RBC-ENTMCNC: 31.4 GM/DL — LOW (ref 32–36)
MCV RBC AUTO: 94.5 FL — SIGNIFICANT CHANGE UP (ref 80–100)
PLATELET # BLD AUTO: 85 K/UL — LOW (ref 150–400)
POTASSIUM SERPL-MCNC: 4.2 MMOL/L — SIGNIFICANT CHANGE UP (ref 3.5–5.3)
POTASSIUM SERPL-SCNC: 4.2 MMOL/L — SIGNIFICANT CHANGE UP (ref 3.5–5.3)
RBC # BLD: 2.73 M/UL — LOW (ref 3.8–5.2)
RBC # FLD: 13.9 % — SIGNIFICANT CHANGE UP (ref 10.3–14.5)
SODIUM SERPL-SCNC: 138 MMOL/L — SIGNIFICANT CHANGE UP (ref 135–145)
WBC # BLD: 6.78 K/UL — SIGNIFICANT CHANGE UP (ref 3.8–10.5)
WBC # FLD AUTO: 6.78 K/UL — SIGNIFICANT CHANGE UP (ref 3.8–10.5)

## 2021-06-12 PROCEDURE — 71045 X-RAY EXAM CHEST 1 VIEW: CPT | Mod: 26

## 2021-06-12 PROCEDURE — 93010 ELECTROCARDIOGRAM REPORT: CPT

## 2021-06-12 RX ORDER — FUROSEMIDE 40 MG
40 TABLET ORAL DAILY
Refills: 0 | Status: DISCONTINUED | OUTPATIENT
Start: 2021-06-12 | End: 2021-06-15

## 2021-06-12 RX ORDER — SODIUM CHLORIDE 9 MG/ML
3 INJECTION INTRAMUSCULAR; INTRAVENOUS; SUBCUTANEOUS EVERY 8 HOURS
Refills: 0 | Status: DISCONTINUED | OUTPATIENT
Start: 2021-06-12 | End: 2021-06-15

## 2021-06-12 RX ORDER — METOPROLOL TARTRATE 50 MG
12.5 TABLET ORAL EVERY 12 HOURS
Refills: 0 | Status: DISCONTINUED | OUTPATIENT
Start: 2021-06-12 | End: 2021-06-15

## 2021-06-12 RX ADMIN — Medication 250 MILLIGRAM(S): at 07:43

## 2021-06-12 RX ADMIN — LIDOCAINE 1 PATCH: 4 CREAM TOPICAL at 19:19

## 2021-06-12 RX ADMIN — ATORVASTATIN CALCIUM 20 MILLIGRAM(S): 80 TABLET, FILM COATED ORAL at 22:23

## 2021-06-12 RX ADMIN — ONDANSETRON 4 MILLIGRAM(S): 8 TABLET, FILM COATED ORAL at 03:48

## 2021-06-12 RX ADMIN — OXYCODONE HYDROCHLORIDE 5 MILLIGRAM(S): 5 TABLET ORAL at 19:50

## 2021-06-12 RX ADMIN — Medication 81 MILLIGRAM(S): at 12:09

## 2021-06-12 RX ADMIN — SODIUM CHLORIDE 3 MILLILITER(S): 9 INJECTION INTRAMUSCULAR; INTRAVENOUS; SUBCUTANEOUS at 22:19

## 2021-06-12 RX ADMIN — OXYCODONE HYDROCHLORIDE 5 MILLIGRAM(S): 5 TABLET ORAL at 00:05

## 2021-06-12 RX ADMIN — OXYCODONE HYDROCHLORIDE 5 MILLIGRAM(S): 5 TABLET ORAL at 20:20

## 2021-06-12 RX ADMIN — Medication 1 PACKET(S): at 22:25

## 2021-06-12 RX ADMIN — SODIUM CHLORIDE 3 MILLILITER(S): 9 INJECTION INTRAMUSCULAR; INTRAVENOUS; SUBCUTANEOUS at 13:03

## 2021-06-12 RX ADMIN — Medication 40 MILLIGRAM(S): at 08:00

## 2021-06-12 RX ADMIN — ATORVASTATIN CALCIUM 20 MILLIGRAM(S): 80 TABLET, FILM COATED ORAL at 00:05

## 2021-06-12 RX ADMIN — Medication 100 MILLIGRAM(S): at 08:15

## 2021-06-12 RX ADMIN — PANTOPRAZOLE SODIUM 40 MILLIGRAM(S): 20 TABLET, DELAYED RELEASE ORAL at 12:10

## 2021-06-12 RX ADMIN — Medication 100 MILLIGRAM(S): at 00:06

## 2021-06-12 RX ADMIN — OXYCODONE HYDROCHLORIDE 5 MILLIGRAM(S): 5 TABLET ORAL at 10:16

## 2021-06-12 RX ADMIN — Medication 650 MILLIGRAM(S): at 16:19

## 2021-06-12 RX ADMIN — OXYCODONE HYDROCHLORIDE 5 MILLIGRAM(S): 5 TABLET ORAL at 01:00

## 2021-06-12 RX ADMIN — ONDANSETRON 4 MILLIGRAM(S): 8 TABLET, FILM COATED ORAL at 19:50

## 2021-06-12 RX ADMIN — SENNA PLUS 2 TABLET(S): 8.6 TABLET ORAL at 22:23

## 2021-06-12 RX ADMIN — OXYCODONE HYDROCHLORIDE 5 MILLIGRAM(S): 5 TABLET ORAL at 09:16

## 2021-06-12 RX ADMIN — Medication 650 MILLIGRAM(S): at 22:53

## 2021-06-12 RX ADMIN — LIDOCAINE 1 PATCH: 4 CREAM TOPICAL at 12:10

## 2021-06-12 RX ADMIN — Medication 650 MILLIGRAM(S): at 22:23

## 2021-06-12 NOTE — DIETITIAN INITIAL EVALUATION ADULT. - PERTINENT LABORATORY DATA
06-12 Na138 mmol/L Glu 109 mg/dL<H> K+ 4.2 mmol/L Cr  0.42 mg/dL<L> BUN 14.5 mg/dL Phos n/a   Alb n/a   PAB n/a

## 2021-06-12 NOTE — DIETITIAN INITIAL EVALUATION ADULT. - ORAL INTAKE PTA/DIET HISTORY
Pt reports good po intake PTA and taking clears well to be upgraded today. Pt states she had 45# intentional weight loss with weight watchers. Reviewed diet guidelines with pt.

## 2021-06-12 NOTE — DIETITIAN INITIAL EVALUATION ADULT. - OTHER INFO
71 year old female present today for PST. She reports DR. Loredo her cardiologist who monitor closely her MVP. Patient reports on recent PARVEEN it was noted with worsen mitral regurgitation and ruptured chordae. On 6/10 pt underwent MV Repair with Dr. Rossi, post op course uneventful.

## 2021-06-13 LAB
ALBUMIN SERPL ELPH-MCNC: 3.4 G/DL — SIGNIFICANT CHANGE UP (ref 3.3–5.2)
ALP SERPL-CCNC: 38 U/L — LOW (ref 40–120)
ALT FLD-CCNC: 13 U/L — SIGNIFICANT CHANGE UP
ANION GAP SERPL CALC-SCNC: 6 MMOL/L — SIGNIFICANT CHANGE UP (ref 5–17)
ANION GAP SERPL CALC-SCNC: 7 MMOL/L — SIGNIFICANT CHANGE UP (ref 5–17)
AST SERPL-CCNC: 13 U/L — SIGNIFICANT CHANGE UP
BILIRUB SERPL-MCNC: 1 MG/DL — SIGNIFICANT CHANGE UP (ref 0.4–2)
BUN SERPL-MCNC: 10.4 MG/DL — SIGNIFICANT CHANGE UP (ref 8–20)
BUN SERPL-MCNC: 14.6 MG/DL — SIGNIFICANT CHANGE UP (ref 8–20)
CALCIUM SERPL-MCNC: 8.3 MG/DL — LOW (ref 8.6–10.2)
CALCIUM SERPL-MCNC: 8.7 MG/DL — SIGNIFICANT CHANGE UP (ref 8.6–10.2)
CHLORIDE SERPL-SCNC: 102 MMOL/L — SIGNIFICANT CHANGE UP (ref 98–107)
CHLORIDE SERPL-SCNC: 106 MMOL/L — SIGNIFICANT CHANGE UP (ref 98–107)
CO2 SERPL-SCNC: 28 MMOL/L — SIGNIFICANT CHANGE UP (ref 22–29)
CO2 SERPL-SCNC: 32 MMOL/L — HIGH (ref 22–29)
CREAT SERPL-MCNC: 0.42 MG/DL — LOW (ref 0.5–1.3)
CREAT SERPL-MCNC: 0.58 MG/DL — SIGNIFICANT CHANGE UP (ref 0.5–1.3)
GLUCOSE BLDC GLUCOMTR-MCNC: 110 MG/DL — HIGH (ref 70–99)
GLUCOSE BLDC GLUCOMTR-MCNC: 87 MG/DL — SIGNIFICANT CHANGE UP (ref 70–99)
GLUCOSE SERPL-MCNC: 107 MG/DL — HIGH (ref 70–99)
GLUCOSE SERPL-MCNC: 113 MG/DL — HIGH (ref 70–99)
HCT VFR BLD CALC: 30.2 % — LOW (ref 34.5–45)
HGB BLD-MCNC: 9.6 G/DL — LOW (ref 11.5–15.5)
MAGNESIUM SERPL-MCNC: 1.9 MG/DL — SIGNIFICANT CHANGE UP (ref 1.6–2.6)
MAGNESIUM SERPL-MCNC: 2 MG/DL — SIGNIFICANT CHANGE UP (ref 1.6–2.6)
MCHC RBC-ENTMCNC: 29.7 PG — SIGNIFICANT CHANGE UP (ref 27–34)
MCHC RBC-ENTMCNC: 31.8 GM/DL — LOW (ref 32–36)
MCV RBC AUTO: 93.5 FL — SIGNIFICANT CHANGE UP (ref 80–100)
PHOSPHATE SERPL-MCNC: 2 MG/DL — LOW (ref 2.4–4.7)
PLATELET # BLD AUTO: 89 K/UL — LOW (ref 150–400)
POTASSIUM SERPL-MCNC: 3.4 MMOL/L — LOW (ref 3.5–5.3)
POTASSIUM SERPL-MCNC: 4 MMOL/L — SIGNIFICANT CHANGE UP (ref 3.5–5.3)
POTASSIUM SERPL-SCNC: 3.4 MMOL/L — LOW (ref 3.5–5.3)
POTASSIUM SERPL-SCNC: 4 MMOL/L — SIGNIFICANT CHANGE UP (ref 3.5–5.3)
PROT SERPL-MCNC: 5.4 G/DL — LOW (ref 6.6–8.7)
RBC # BLD: 3.23 M/UL — LOW (ref 3.8–5.2)
RBC # FLD: 13.9 % — SIGNIFICANT CHANGE UP (ref 10.3–14.5)
SODIUM SERPL-SCNC: 140 MMOL/L — SIGNIFICANT CHANGE UP (ref 135–145)
SODIUM SERPL-SCNC: 141 MMOL/L — SIGNIFICANT CHANGE UP (ref 135–145)
WBC # BLD: 6.37 K/UL — SIGNIFICANT CHANGE UP (ref 3.8–10.5)
WBC # FLD AUTO: 6.37 K/UL — SIGNIFICANT CHANGE UP (ref 3.8–10.5)

## 2021-06-13 PROCEDURE — 71045 X-RAY EXAM CHEST 1 VIEW: CPT | Mod: 26

## 2021-06-13 PROCEDURE — 93010 ELECTROCARDIOGRAM REPORT: CPT

## 2021-06-13 RX ORDER — MAGNESIUM SULFATE 500 MG/ML
2 VIAL (ML) INJECTION ONCE
Refills: 0 | Status: COMPLETED | OUTPATIENT
Start: 2021-06-13 | End: 2021-06-13

## 2021-06-13 RX ORDER — POTASSIUM CHLORIDE 20 MEQ
40 PACKET (EA) ORAL EVERY 4 HOURS
Refills: 0 | Status: DISCONTINUED | OUTPATIENT
Start: 2021-06-13 | End: 2021-06-13

## 2021-06-13 RX ORDER — SODIUM,POTASSIUM PHOSPHATES 278-250MG
1 POWDER IN PACKET (EA) ORAL ONCE
Refills: 0 | Status: COMPLETED | OUTPATIENT
Start: 2021-06-13 | End: 2021-06-13

## 2021-06-13 RX ORDER — MAGNESIUM SULFATE 500 MG/ML
2 VIAL (ML) INJECTION ONCE
Refills: 0 | Status: DISCONTINUED | OUTPATIENT
Start: 2021-06-13 | End: 2021-06-13

## 2021-06-13 RX ORDER — POTASSIUM CHLORIDE 20 MEQ
40 PACKET (EA) ORAL ONCE
Refills: 0 | Status: COMPLETED | OUTPATIENT
Start: 2021-06-13 | End: 2021-06-13

## 2021-06-13 RX ADMIN — OXYCODONE HYDROCHLORIDE 5 MILLIGRAM(S): 5 TABLET ORAL at 01:25

## 2021-06-13 RX ADMIN — SENNA PLUS 2 TABLET(S): 8.6 TABLET ORAL at 21:48

## 2021-06-13 RX ADMIN — Medication 40 MILLIEQUIVALENT(S): at 23:48

## 2021-06-13 RX ADMIN — OXYCODONE HYDROCHLORIDE 5 MILLIGRAM(S): 5 TABLET ORAL at 15:58

## 2021-06-13 RX ADMIN — SODIUM CHLORIDE 3 MILLILITER(S): 9 INJECTION INTRAMUSCULAR; INTRAVENOUS; SUBCUTANEOUS at 14:00

## 2021-06-13 RX ADMIN — Medication 40 MILLIGRAM(S): at 05:26

## 2021-06-13 RX ADMIN — OXYCODONE HYDROCHLORIDE 5 MILLIGRAM(S): 5 TABLET ORAL at 21:48

## 2021-06-13 RX ADMIN — ONDANSETRON 4 MILLIGRAM(S): 8 TABLET, FILM COATED ORAL at 15:58

## 2021-06-13 RX ADMIN — Medication 50 GRAM(S): at 18:35

## 2021-06-13 RX ADMIN — Medication 1 PACKET(S): at 23:48

## 2021-06-13 RX ADMIN — PANTOPRAZOLE SODIUM 40 MILLIGRAM(S): 20 TABLET, DELAYED RELEASE ORAL at 18:35

## 2021-06-13 RX ADMIN — SODIUM CHLORIDE 3 MILLILITER(S): 9 INJECTION INTRAMUSCULAR; INTRAVENOUS; SUBCUTANEOUS at 21:43

## 2021-06-13 RX ADMIN — SODIUM CHLORIDE 3 MILLILITER(S): 9 INJECTION INTRAMUSCULAR; INTRAVENOUS; SUBCUTANEOUS at 05:33

## 2021-06-13 RX ADMIN — OXYCODONE HYDROCHLORIDE 5 MILLIGRAM(S): 5 TABLET ORAL at 05:56

## 2021-06-13 RX ADMIN — ONDANSETRON 4 MILLIGRAM(S): 8 TABLET, FILM COATED ORAL at 00:53

## 2021-06-13 RX ADMIN — LIDOCAINE 1 PATCH: 4 CREAM TOPICAL at 00:00

## 2021-06-13 RX ADMIN — OXYCODONE HYDROCHLORIDE 5 MILLIGRAM(S): 5 TABLET ORAL at 22:18

## 2021-06-13 RX ADMIN — OXYCODONE HYDROCHLORIDE 5 MILLIGRAM(S): 5 TABLET ORAL at 00:55

## 2021-06-13 RX ADMIN — OXYCODONE HYDROCHLORIDE 5 MILLIGRAM(S): 5 TABLET ORAL at 05:26

## 2021-06-13 RX ADMIN — Medication 81 MILLIGRAM(S): at 18:35

## 2021-06-13 RX ADMIN — Medication 12.5 MILLIGRAM(S): at 02:48

## 2021-06-13 RX ADMIN — ATORVASTATIN CALCIUM 20 MILLIGRAM(S): 80 TABLET, FILM COATED ORAL at 21:48

## 2021-06-13 NOTE — CHART NOTE - NSCHARTNOTEFT_GEN_A_CORE
CTS Addendum ACP note:    Briefly, 71 year old female with a medical history of Mitral Valve prolapse was noted to have worsening MR an ruptured chordae on PARVEEN ordered by Cardiologist Dr. Byrnes. Patient now s/p MV Repair with Dr. Rossi on 6/10/21, Post op course remains uneventful at this time.    Patient seen & examined, vitals stable, medications, radiologic & laboratory results all reviewed.  Patient in sinus rhythm, epicardial wires attached to EPM VVI, 50, 10, .8. Patient without complaint.    PHYSICAL EXAM:  General: NAD  Neurology: A&Ox3, nonfocal, HEWITT x 4  Respiratory: Clear bilaterally   CV: RRR, S1S2, no murmurs, rubs or gallops  Abdominal: Soft, NT, ND +BS, Last BM 6/9/21 per pt  Extremities: MAEx4, trace edema to BLE + peripheral pulses  Incisions: Midsternal incision with +mepliex dressing in place, sternum stable  Wires: +PW to EPM    PLAN:  Continue OXY PRN & Tylenol PRN for pain management  Continue ASA & Lipitor   Continue Lopressor for blood pressure control  Encourage the use of I/S, CDBE, OOB to chair, ambulate as tolerated, daily PT  Continue Lasix for leg edema  Daily I/O's  Repeat chest xray in am  Continue Lovenox & SCD's for DVT prophylaxis  Continue Metamucil & senna for bowel regimen  Isolate PW's  Discussed plan with ACP PA

## 2021-06-14 ENCOUNTER — TRANSCRIPTION ENCOUNTER (OUTPATIENT)
Age: 71
End: 2021-06-14

## 2021-06-14 LAB
ALBUMIN SERPL ELPH-MCNC: 3.1 G/DL — LOW (ref 3.3–5.2)
ALP SERPL-CCNC: 35 U/L — LOW (ref 40–120)
ALT FLD-CCNC: 11 U/L — SIGNIFICANT CHANGE UP
ANION GAP SERPL CALC-SCNC: 5 MMOL/L — SIGNIFICANT CHANGE UP (ref 5–17)
AST SERPL-CCNC: 11 U/L — SIGNIFICANT CHANGE UP
BILIRUB SERPL-MCNC: 1.1 MG/DL — SIGNIFICANT CHANGE UP (ref 0.4–2)
BUN SERPL-MCNC: 12.3 MG/DL — SIGNIFICANT CHANGE UP (ref 8–20)
CALCIUM SERPL-MCNC: 8.3 MG/DL — LOW (ref 8.6–10.2)
CHLORIDE SERPL-SCNC: 103 MMOL/L — SIGNIFICANT CHANGE UP (ref 98–107)
CO2 SERPL-SCNC: 31 MMOL/L — HIGH (ref 22–29)
CREAT SERPL-MCNC: 0.46 MG/DL — LOW (ref 0.5–1.3)
GLUCOSE SERPL-MCNC: 97 MG/DL — SIGNIFICANT CHANGE UP (ref 70–99)
HCT VFR BLD CALC: 31.2 % — LOW (ref 34.5–45)
HGB BLD-MCNC: 9.9 G/DL — LOW (ref 11.5–15.5)
MAGNESIUM SERPL-MCNC: 2.2 MG/DL — SIGNIFICANT CHANGE UP (ref 1.6–2.6)
MCHC RBC-ENTMCNC: 30.1 PG — SIGNIFICANT CHANGE UP (ref 27–34)
MCHC RBC-ENTMCNC: 31.7 GM/DL — LOW (ref 32–36)
MCV RBC AUTO: 94.8 FL — SIGNIFICANT CHANGE UP (ref 80–100)
PHOSPHATE SERPL-MCNC: 2.5 MG/DL — SIGNIFICANT CHANGE UP (ref 2.4–4.7)
PLATELET # BLD AUTO: 129 K/UL — LOW (ref 150–400)
POTASSIUM SERPL-MCNC: 4.2 MMOL/L — SIGNIFICANT CHANGE UP (ref 3.5–5.3)
POTASSIUM SERPL-SCNC: 4.2 MMOL/L — SIGNIFICANT CHANGE UP (ref 3.5–5.3)
PROT SERPL-MCNC: 5 G/DL — LOW (ref 6.6–8.7)
RBC # BLD: 3.29 M/UL — LOW (ref 3.8–5.2)
RBC # FLD: 13.7 % — SIGNIFICANT CHANGE UP (ref 10.3–14.5)
SODIUM SERPL-SCNC: 139 MMOL/L — SIGNIFICANT CHANGE UP (ref 135–145)
WBC # BLD: 5.58 K/UL — SIGNIFICANT CHANGE UP (ref 3.8–10.5)
WBC # FLD AUTO: 5.58 K/UL — SIGNIFICANT CHANGE UP (ref 3.8–10.5)

## 2021-06-14 PROCEDURE — 99024 POSTOP FOLLOW-UP VISIT: CPT

## 2021-06-14 PROCEDURE — 93010 ELECTROCARDIOGRAM REPORT: CPT

## 2021-06-14 PROCEDURE — 71045 X-RAY EXAM CHEST 1 VIEW: CPT | Mod: 26

## 2021-06-14 RX ADMIN — ATORVASTATIN CALCIUM 20 MILLIGRAM(S): 80 TABLET, FILM COATED ORAL at 21:00

## 2021-06-14 RX ADMIN — Medication 81 MILLIGRAM(S): at 11:06

## 2021-06-14 RX ADMIN — Medication 12.5 MILLIGRAM(S): at 17:08

## 2021-06-14 RX ADMIN — SENNA PLUS 2 TABLET(S): 8.6 TABLET ORAL at 21:00

## 2021-06-14 RX ADMIN — OXYCODONE HYDROCHLORIDE 5 MILLIGRAM(S): 5 TABLET ORAL at 03:23

## 2021-06-14 RX ADMIN — OXYCODONE HYDROCHLORIDE 5 MILLIGRAM(S): 5 TABLET ORAL at 21:01

## 2021-06-14 RX ADMIN — LIDOCAINE 1 PATCH: 4 CREAM TOPICAL at 19:26

## 2021-06-14 RX ADMIN — Medication 12.5 MILLIGRAM(S): at 05:43

## 2021-06-14 RX ADMIN — LIDOCAINE 1 PATCH: 4 CREAM TOPICAL at 11:14

## 2021-06-14 RX ADMIN — SODIUM CHLORIDE 3 MILLILITER(S): 9 INJECTION INTRAMUSCULAR; INTRAVENOUS; SUBCUTANEOUS at 21:03

## 2021-06-14 RX ADMIN — OXYCODONE HYDROCHLORIDE 5 MILLIGRAM(S): 5 TABLET ORAL at 11:09

## 2021-06-14 RX ADMIN — ONDANSETRON 4 MILLIGRAM(S): 8 TABLET, FILM COATED ORAL at 03:20

## 2021-06-14 RX ADMIN — OXYCODONE HYDROCHLORIDE 5 MILLIGRAM(S): 5 TABLET ORAL at 11:39

## 2021-06-14 RX ADMIN — OXYCODONE HYDROCHLORIDE 5 MILLIGRAM(S): 5 TABLET ORAL at 21:45

## 2021-06-14 RX ADMIN — PANTOPRAZOLE SODIUM 40 MILLIGRAM(S): 20 TABLET, DELAYED RELEASE ORAL at 11:06

## 2021-06-14 RX ADMIN — Medication 40 MILLIGRAM(S): at 05:43

## 2021-06-14 RX ADMIN — SODIUM CHLORIDE 3 MILLILITER(S): 9 INJECTION INTRAMUSCULAR; INTRAVENOUS; SUBCUTANEOUS at 05:42

## 2021-06-14 RX ADMIN — SODIUM CHLORIDE 3 MILLILITER(S): 9 INJECTION INTRAMUSCULAR; INTRAVENOUS; SUBCUTANEOUS at 12:01

## 2021-06-15 ENCOUNTER — NON-APPOINTMENT (OUTPATIENT)
Age: 71
End: 2021-06-15

## 2021-06-15 ENCOUNTER — TRANSCRIPTION ENCOUNTER (OUTPATIENT)
Age: 71
End: 2021-06-15

## 2021-06-15 VITALS
HEART RATE: 98 BPM | RESPIRATION RATE: 17 BRPM | OXYGEN SATURATION: 98 % | TEMPERATURE: 98 F | DIASTOLIC BLOOD PRESSURE: 77 MMHG | SYSTOLIC BLOOD PRESSURE: 114 MMHG

## 2021-06-15 LAB
ANION GAP SERPL CALC-SCNC: 7 MMOL/L — SIGNIFICANT CHANGE UP (ref 5–17)
BUN SERPL-MCNC: 17.8 MG/DL — SIGNIFICANT CHANGE UP (ref 8–20)
CALCIUM SERPL-MCNC: 8.8 MG/DL — SIGNIFICANT CHANGE UP (ref 8.6–10.2)
CHLORIDE SERPL-SCNC: 101 MMOL/L — SIGNIFICANT CHANGE UP (ref 98–107)
CO2 SERPL-SCNC: 30 MMOL/L — HIGH (ref 22–29)
CREAT SERPL-MCNC: 0.46 MG/DL — LOW (ref 0.5–1.3)
GLUCOSE SERPL-MCNC: 91 MG/DL — SIGNIFICANT CHANGE UP (ref 70–99)
HCT VFR BLD CALC: 32.7 % — LOW (ref 34.5–45)
HGB BLD-MCNC: 10.2 G/DL — LOW (ref 11.5–15.5)
MAGNESIUM SERPL-MCNC: 2.1 MG/DL — SIGNIFICANT CHANGE UP (ref 1.6–2.6)
MCHC RBC-ENTMCNC: 29.5 PG — SIGNIFICANT CHANGE UP (ref 27–34)
MCHC RBC-ENTMCNC: 31.2 GM/DL — LOW (ref 32–36)
MCV RBC AUTO: 94.5 FL — SIGNIFICANT CHANGE UP (ref 80–100)
PLATELET # BLD AUTO: 164 K/UL — SIGNIFICANT CHANGE UP (ref 150–400)
POTASSIUM SERPL-MCNC: 4.3 MMOL/L — SIGNIFICANT CHANGE UP (ref 3.5–5.3)
POTASSIUM SERPL-SCNC: 4.3 MMOL/L — SIGNIFICANT CHANGE UP (ref 3.5–5.3)
RBC # BLD: 3.46 M/UL — LOW (ref 3.8–5.2)
RBC # FLD: 13.5 % — SIGNIFICANT CHANGE UP (ref 10.3–14.5)
SODIUM SERPL-SCNC: 138 MMOL/L — SIGNIFICANT CHANGE UP (ref 135–145)
WBC # BLD: 5.08 K/UL — SIGNIFICANT CHANGE UP (ref 3.8–10.5)
WBC # FLD AUTO: 5.08 K/UL — SIGNIFICANT CHANGE UP (ref 3.8–10.5)

## 2021-06-15 PROCEDURE — 86923 COMPATIBILITY TEST ELECTRIC: CPT

## 2021-06-15 PROCEDURE — 83735 ASSAY OF MAGNESIUM: CPT

## 2021-06-15 PROCEDURE — 86901 BLOOD TYPING SEROLOGIC RH(D): CPT

## 2021-06-15 PROCEDURE — 82553 CREATINE MB FRACTION: CPT

## 2021-06-15 PROCEDURE — 93325 DOPPLER ECHO COLOR FLOW MAPG: CPT

## 2021-06-15 PROCEDURE — 85027 COMPLETE CBC AUTOMATED: CPT

## 2021-06-15 PROCEDURE — 36430 TRANSFUSION BLD/BLD COMPNT: CPT

## 2021-06-15 PROCEDURE — 84132 ASSAY OF SERUM POTASSIUM: CPT

## 2021-06-15 PROCEDURE — 82947 ASSAY GLUCOSE BLOOD QUANT: CPT

## 2021-06-15 PROCEDURE — 93010 ELECTROCARDIOGRAM REPORT: CPT

## 2021-06-15 PROCEDURE — 84295 ASSAY OF SERUM SODIUM: CPT

## 2021-06-15 PROCEDURE — 86769 SARS-COV-2 COVID-19 ANTIBODY: CPT

## 2021-06-15 PROCEDURE — 71045 X-RAY EXAM CHEST 1 VIEW: CPT

## 2021-06-15 PROCEDURE — 82330 ASSAY OF CALCIUM: CPT

## 2021-06-15 PROCEDURE — 83605 ASSAY OF LACTIC ACID: CPT

## 2021-06-15 PROCEDURE — 86850 RBC ANTIBODY SCREEN: CPT

## 2021-06-15 PROCEDURE — 94002 VENT MGMT INPAT INIT DAY: CPT

## 2021-06-15 PROCEDURE — C1769: CPT

## 2021-06-15 PROCEDURE — 86900 BLOOD TYPING SEROLOGIC ABO: CPT

## 2021-06-15 PROCEDURE — 94760 N-INVAS EAR/PLS OXIMETRY 1: CPT

## 2021-06-15 PROCEDURE — C1751: CPT

## 2021-06-15 PROCEDURE — 88305 TISSUE EXAM BY PATHOLOGIST: CPT

## 2021-06-15 PROCEDURE — C1889: CPT

## 2021-06-15 PROCEDURE — 36415 COLL VENOUS BLD VENIPUNCTURE: CPT

## 2021-06-15 PROCEDURE — 93005 ELECTROCARDIOGRAM TRACING: CPT

## 2021-06-15 PROCEDURE — 85014 HEMATOCRIT: CPT

## 2021-06-15 PROCEDURE — 82550 ASSAY OF CK (CPK): CPT

## 2021-06-15 PROCEDURE — P9016: CPT

## 2021-06-15 PROCEDURE — P9045: CPT

## 2021-06-15 PROCEDURE — 84100 ASSAY OF PHOSPHORUS: CPT

## 2021-06-15 PROCEDURE — 97163 PT EVAL HIGH COMPLEX 45 MIN: CPT

## 2021-06-15 PROCEDURE — 99024 POSTOP FOLLOW-UP VISIT: CPT

## 2021-06-15 PROCEDURE — 82962 GLUCOSE BLOOD TEST: CPT

## 2021-06-15 PROCEDURE — 84484 ASSAY OF TROPONIN QUANT: CPT

## 2021-06-15 PROCEDURE — 71045 X-RAY EXAM CHEST 1 VIEW: CPT | Mod: 26

## 2021-06-15 PROCEDURE — 85610 PROTHROMBIN TIME: CPT

## 2021-06-15 PROCEDURE — 82803 BLOOD GASES ANY COMBINATION: CPT

## 2021-06-15 PROCEDURE — 80048 BASIC METABOLIC PNL TOTAL CA: CPT

## 2021-06-15 PROCEDURE — 93312 ECHO TRANSESOPHAGEAL: CPT

## 2021-06-15 PROCEDURE — 93320 DOPPLER ECHO COMPLETE: CPT

## 2021-06-15 PROCEDURE — 85730 THROMBOPLASTIN TIME PARTIAL: CPT

## 2021-06-15 PROCEDURE — P9100: CPT

## 2021-06-15 PROCEDURE — 80053 COMPREHEN METABOLIC PANEL: CPT

## 2021-06-15 PROCEDURE — P9037: CPT

## 2021-06-15 PROCEDURE — 85018 HEMOGLOBIN: CPT

## 2021-06-15 PROCEDURE — 82435 ASSAY OF BLOOD CHLORIDE: CPT

## 2021-06-15 RX ORDER — FUROSEMIDE 40 MG
1 TABLET ORAL
Qty: 7 | Refills: 0
Start: 2021-06-15 | End: 2021-06-21

## 2021-06-15 RX ORDER — METOPROLOL TARTRATE 50 MG
0.5 TABLET ORAL
Qty: 30 | Refills: 1
Start: 2021-06-15 | End: 2021-08-13

## 2021-06-15 RX ORDER — PSYLLIUM SEED (WITH DEXTROSE)
1 POWDER (GRAM) ORAL
Qty: 0 | Refills: 0 | DISCHARGE
Start: 2021-06-15

## 2021-06-15 RX ORDER — ATORVASTATIN CALCIUM 80 MG/1
1 TABLET, FILM COATED ORAL
Qty: 0 | Refills: 0 | DISCHARGE

## 2021-06-15 RX ORDER — ACETAMINOPHEN 500 MG
2 TABLET ORAL
Qty: 0 | Refills: 0 | DISCHARGE
Start: 2021-06-15

## 2021-06-15 RX ORDER — ATORVASTATIN CALCIUM 80 MG/1
1 TABLET, FILM COATED ORAL
Qty: 30 | Refills: 1
Start: 2021-06-15 | End: 2021-08-13

## 2021-06-15 RX ORDER — SENNA PLUS 8.6 MG/1
2 TABLET ORAL
Qty: 60 | Refills: 0
Start: 2021-06-15 | End: 2021-07-14

## 2021-06-15 RX ORDER — ASPIRIN/CALCIUM CARB/MAGNESIUM 324 MG
1 TABLET ORAL
Qty: 30 | Refills: 1
Start: 2021-06-15 | End: 2021-08-13

## 2021-06-15 RX ORDER — OXYCODONE AND ACETAMINOPHEN 5; 325 MG/1; MG/1
1 TABLET ORAL
Qty: 20 | Refills: 0
Start: 2021-06-15 | End: 2021-06-19

## 2021-06-15 RX ORDER — ATENOLOL 25 MG/1
1 TABLET ORAL
Qty: 0 | Refills: 0 | DISCHARGE

## 2021-06-15 RX ADMIN — OXYCODONE HYDROCHLORIDE 5 MILLIGRAM(S): 5 TABLET ORAL at 01:02

## 2021-06-15 RX ADMIN — Medication 650 MILLIGRAM(S): at 13:00

## 2021-06-15 RX ADMIN — PANTOPRAZOLE SODIUM 40 MILLIGRAM(S): 20 TABLET, DELAYED RELEASE ORAL at 08:23

## 2021-06-15 RX ADMIN — Medication 650 MILLIGRAM(S): at 12:20

## 2021-06-15 RX ADMIN — SODIUM CHLORIDE 3 MILLILITER(S): 9 INJECTION INTRAMUSCULAR; INTRAVENOUS; SUBCUTANEOUS at 05:35

## 2021-06-15 RX ADMIN — Medication 81 MILLIGRAM(S): at 08:21

## 2021-06-15 RX ADMIN — OXYCODONE HYDROCHLORIDE 5 MILLIGRAM(S): 5 TABLET ORAL at 01:45

## 2021-06-15 RX ADMIN — Medication 40 MILLIGRAM(S): at 05:30

## 2021-06-15 RX ADMIN — LIDOCAINE 1 PATCH: 4 CREAM TOPICAL at 00:47

## 2021-06-15 RX ADMIN — Medication 12.5 MILLIGRAM(S): at 05:30

## 2021-06-15 RX ADMIN — SODIUM CHLORIDE 3 MILLILITER(S): 9 INJECTION INTRAMUSCULAR; INTRAVENOUS; SUBCUTANEOUS at 13:57

## 2021-06-15 NOTE — DISCHARGE NOTE NURSING/CASE MANAGEMENT/SOCIAL WORK - NSDCFUADDAPPT_GEN_ALL_CORE_FT
Please follow up with Dr. Rossi in 2 weeks. Please call for an appointment.    Your Care Navigator Nurse Practitioner will be in touch to see you in your home within a few days from discharge. The [facility] program can help ensure you understand your medications, discharge instructions and answer any questions you may have at that time. They are also a great source to address concerns during the day and may be reached at [phone].     If you need any assistance for making any appointments for a new consult or referral in any specialty, please call our [facility] Clinical Coordination Center at [phone].

## 2021-06-15 NOTE — DISCHARGE NOTE PROVIDER - NSDCFUADDINST_GEN_ALL_CORE_FT
[facility] [employer] Nurse Practitioner  [phone]  They will call you to arrange complimentary home visits post operatively     Please call the Cardiothoracic Surgery office at [phone] if you are experiencing any shortness of breath, chest pain, fevers or chills, drainage from the incisions, persistent nausea, vomiting or if you have any questions about your medications. If the symptoms are severe, call 911 and go to the nearest hospital. You can also call [phone] for an emergency [facility] ambulance, which will take you to the closest [facility].    If you need any assistance for making any appointments for a new consult or referral in any specialty, please call our [facility] Clinical Coordination Center at [phone].

## 2021-06-15 NOTE — DISCHARGE NOTE PROVIDER - CARE PROVIDER_API CALL
Lalo Rossi)  Surgery; Thoracic and Cardiac Surgery  [address] [place] [address]  Phone: [phone]  Fax: [phone]  Follow Up Time:     Sydnee Loredo)  Internal Medicine  [address] [place] [id number]  Phone: [phone]  Fax: [phone]  Follow Up Time:    Lalo Rossi)  Surgery; Thoracic and Cardiac Surgery  [address] [place] [address]  Phone: [phone]  Fax: [phone]  Follow Up Time: 2 weeks    Sydnee Loredo)  Internal Medicine  [address] [place] [id number]  Phone: [phone]  Fax: [phone]  Follow Up Time: 1 month

## 2021-06-15 NOTE — PROGRESS NOTE ADULT - SUBJECTIVE AND OBJECTIVE BOX
POD # 3 s/p Mitral Valve Repair    PAST MEDICAL & SURGICAL HISTORY:  HLD (hyperlipidemia)  MVP (mitral valve prolapse)  Severe mitral regurgitation  H/O tubal ligation  H/O vein stripping  H/O breast biopsy    FAMILY HISTORY:  FH: hypertension (Mother)  FH: heart attack (Father)    Brief Hospital Course: 71 year old female with a medical history of Mitral Valve prolapse was noted to have worsening MR an ruptured chordae on PARVEEN ordered by Cardiologist Dr. Byrnes. Patient now s/p MV Repair with Dr. Rossi on 6/10/21, Post op course remains uneventful at this time.    Significant recent/past 24 hr events: No events reported overnight.     Subjective: Patient lying in bed in no acute distress. +Passing gas, no BM since surgery ye. +Pain controlled. Denies fevers, chills, lightheadedness, dizziness, HA, CP, palpitations, SOB, cough, abdominal pain, N/V, diarrhea, numbness/tingling in extremities, or any other acute complaints.    MEDICATIONS  (STANDING):  aspirin  chewable 81 milliGRAM(s) Oral daily  atorvastatin 20 milliGRAM(s) Oral at bedtime  furosemide    Tablet 40 milliGRAM(s) Oral daily  insulin lispro (ADMELOG) corrective regimen sliding scale   SubCutaneous Before meals and at bedtime  lidocaine   Patch 1 Patch Transdermal every 24 hours  metoprolol tartrate 12.5 milliGRAM(s) Oral every 12 hours  pantoprazole    Tablet 40 milliGRAM(s) Oral daily  psyllium Powder 1 Packet(s) Oral three times a day  senna 2 Tablet(s) Oral at bedtime  sodium chloride 0.9% lock flush 3 milliLiter(s) IV Push every 8 hours    MEDICATIONS  (PRN):  acetaminophen   Tablet .. 650 milliGRAM(s) Oral every 6 hours PRN Mild Pain (1 - 3)  ondansetron Injectable 4 milliGRAM(s) IV Push every 6 hours PRN Nausea and/or Vomiting  oxyCODONE    IR 5 milliGRAM(s) Oral every 4 hours PRN Moderate Pain (4 - 6)  oxyCODONE    IR 10 milliGRAM(s) Oral every 4 hours PRN Severe Pain (7 - 10)    Allergies:  No Known Drug Allergies  patient is allergic to Spinach (Unknown)    Vitals   T(C): 36.9 (12 Jun 2021 22:31), Max: 37 (12 Jun 2021 12:00)  T(F): 98.5 (12 Jun 2021 22:31), Max: 98.6 (12 Jun 2021 12:00)  HR: 81 (12 Jun 2021 22:31) (70 - 87)  BP: 110/71 (12 Jun 2021 22:31) (97/50 - 131/60)  BP(mean): 76 (12 Jun 2021 17:15) (70 - 92)  ABP: 131/56 (12 Jun 2021 05:00) (100/50 - 135/60)  ABP(mean): 80 (12 Jun 2021 05:00) (68 - 85)  RR: 18 (12 Jun 2021 22:31) (11 - 31)  SpO2: 92% (12 Jun 2021 22:31) (92% - 100%)    I&O's Detail    11 Jun 2021 07:01  -  12 Jun 2021 07:00  --------------------------------------------------------  IN:    Albumin 5%  - 250 mL: 500 mL    IV PiggyBack: 50 mL    IV PiggyBack: 250 mL    Norepinephrine: 2 mL    sodium chloride 0.9%: 120 mL    sodium chloride 0.9%: 220 mL  Total IN: 1142 mL    OUT:    Chest Tube (mL): 150 mL    Indwelling Catheter - Urethral (mL): 865 mL  Total OUT: 1015 mL    Total NET: 127 mL      12 Jun 2021 07:01  -  13 Jun 2021 01:02  --------------------------------------------------------  IN:    IV PiggyBack: 50 mL    Oral Fluid: 120 mL  Total IN: 170 mL    OUT:    Voided (mL): 1000 mL  Total OUT: 1000 mL    Total NET: -830 mL    Physical Exam  Neuro: A+O x 3, non-focal, speech clear and intact  HEENT:  NCAT, PERRL, EOMI. No conjuctival edema or icterus, no thrush.    Neck:  Supple, trachea midline  Pulm: CTA, good air entry, equal bilaterally, no rales/rhonchi/wheezing, no accessory muscle use noted  Chest: +PW (settings: VVI, rate: 50, mA: 10, sensitivity: 0.8)  CV: regular rate, regular rhythm, +S1S2, no murmur or rub noted  Abd: soft, NT, ND, + BS  Ext: HEWITT x 4, no edema, no cyanosis or clubbing, distal motor/neuro/circ intact  Skin: warm, dry, well perfused  Incisions: midsternal C/D/I w/ dressing, sternum stable     LABS                        8.1    6.78  )-----------( 85       ( 12 Jun 2021 01:36 )             25.8     06-12    138  |  107  |  14.5  ----------------------------<  109<H>  4.2   |  26.0  |  0.42<L>    Ca    8.1<L>      12 Jun 2021 01:36  Mg     2.0     06-12    TPro  5.0<L>  /  Alb  3.3  /  TBili  1.3  /  DBili  x   /  AST  28  /  ALT  14  /  AlkPhos  29<L>  06-11    POCT Blood Glucose.: 119 mg/dL (06-12-21 @ 22:18)  POCT Blood Glucose.: 92 mg/dL (06-12-21 @ 16:07)  POCT Blood Glucose.: 94 mg/dL (06-12-21 @ 11:28)  POCT Blood Glucose.: 100 mg/dL (06-12-21 @ 07:49)      Last CXR:  < from: Xray Chest 1 View- PORTABLE-Routine (06.11.21 @ 06:37) >  Two expiratory/kyphotic views are compared to 6/10/2021 and demonstrate that the ET and NG tube have been removed..  Cardiac silhouette and pulmonary vasculature are within normal limits with no consolidation, effusion, gross adenopathy, pneumothorax or acute osseous finding.  IMPRESSION:  ET and NG tube removed  < end of copied text >  
Subjective:  70yo F resting comfortable, no c/o pain.      HPI:  71 year old female present today for PST. She reports DR. Loredo her cardiologist who monitor closely her MVP. Patient reports on recent PARVEEN it was noted with worsen mitral regurgitation and ruptured chordae. She is now schedule for Mitral valve repair , possible replacement with Dr. Rossi on 6/1/21           PAST MEDICAL & SURGICAL HISTORY:  HLD (hyperlipidemia)    MVP (mitral valve prolapse)    Severe mitral regurgitation    H/O tubal ligation    H/O vein stripping    H/O breast biopsy            MEDICATIONS  (STANDING):  aspirin  chewable 81 milliGRAM(s) Oral daily  atorvastatin 20 milliGRAM(s) Oral at bedtime  cefuroxime  IVPB 1500 milliGRAM(s) IV Intermittent every 8 hours  chlorhexidine 0.12% Liquid 5 milliLiter(s) Oral Mucosa two times a day  dextrose 40% Gel 15 Gram(s) Oral once  dextrose 5%. 1000 milliLiter(s) (50 mL/Hr) IV Continuous <Continuous>  dextrose 5%. 1000 milliLiter(s) (100 mL/Hr) IV Continuous <Continuous>  dextrose 50% Injectable 25 Gram(s) IV Push once  dextrose 50% Injectable 12.5 Gram(s) IV Push once  dextrose 50% Injectable 25 Gram(s) IV Push once  enoxaparin Injectable 40 milliGRAM(s) SubCutaneous daily  glucagon  Injectable 1 milliGRAM(s) IntraMuscular once  insulin lispro (ADMELOG) corrective regimen sliding scale   SubCutaneous Before meals and at bedtime  lidocaine   Patch 1 Patch Transdermal every 24 hours  pantoprazole    Tablet 40 milliGRAM(s) Oral daily  psyllium Powder 1 Packet(s) Oral three times a day  senna 2 Tablet(s) Oral at bedtime  sodium chloride 0.9%. 1000 milliLiter(s) (5 mL/Hr) IV Continuous <Continuous>  sodium chloride 0.9%. 1000 milliLiter(s) (10 mL/Hr) IV Continuous <Continuous>  vancomycin  IVPB 750 milliGRAM(s) IV Intermittent every 12 hours    MEDICATIONS  (PRN):  acetaminophen   Tablet .. 650 milliGRAM(s) Oral every 6 hours PRN Mild Pain (1 - 3)  ondansetron Injectable 4 milliGRAM(s) IV Push every 6 hours PRN Nausea and/or Vomiting  oxyCODONE    IR 5 milliGRAM(s) Oral every 4 hours PRN Moderate Pain (4 - 6)  oxyCODONE    IR 10 milliGRAM(s) Oral every 4 hours PRN Severe Pain (7 - 10)          Allergies    No Known Drug Allergies  patient is allergic to Spinach (Unknown)    Intolerances          WEIGHTS:  Daily Height in cm: 152.4 (11 Jun 2021 08:15)    Daily   Admit Wt: Drug Dosing Weight  Height (cm): 152.4 (11 Jun 2021 08:15)  Weight (kg): 59 (11 Jun 2021 08:15)  BMI (kg/m2): 25.4 (11 Jun 2021 08:15)  BSA (m2): 1.55 (11 Jun 2021 08:15)  I&O's Summary    10 Oskar 2021 07:01  -  11 Jun 2021 07:00  --------------------------------------------------------  IN: 2311 mL / OUT: 1935 mL / NET: 376 mL    11 Jun 2021 07:01  -  12 Jun 2021 03:02  --------------------------------------------------------  IN: 1087 mL / OUT: 835 mL / NET: 252 mL        VITAL SIGNS:  ICU Vital Signs Last 24 Hrs  T(C): 37.1 (11 Jun 2021 23:59), Max: 37.5 (11 Jun 2021 04:00)  T(F): 98.7 (11 Jun 2021 23:59), Max: 99.5 (11 Jun 2021 04:00)  HR: 78 (12 Jun 2021 02:00) (73 - 81)  BP: 107/56 (12 Jun 2021 02:00) (92/51 - 132/66)  BP(mean): 77 (12 Jun 2021 02:00) (66 - 92)  ABP: 100/50 (12 Jun 2021 02:00) (86/43 - 129/61)  ABP(mean): 68 (12 Jun 2021 02:00) (60 - 86)  RR: 11 (12 Jun 2021 02:00) (11 - 25)  SpO2: 98% (12 Jun 2021 02:00) (90% - 100%)        All laboratory results, radiology and medications reviewed.    LABS:  06-12    138  |  107  |  14.5  ----------------------------<  109<H>  4.2   |  26.0  |  0.42<L>    Ca    8.1<L>      12 Jun 2021 01:36  Mg     2.0     06-12    TPro  5.0<L>  /  Alb  3.3  /  TBili  1.3  /  DBili  x   /  AST  28  /  ALT  14  /  AlkPhos  29<L>  06-11                                 8.1    6.78  )-----------( 85       ( 12 Jun 2021 01:36 )             25.8          PT/INR - ( 10 Oskar 2021 11:37 )   PT: 14.3 sec;   INR: 1.25 ratio         PTT - ( 10 Oskar 2021 11:37 )  PTT:34.6 sec  Bilirubin Total, Serum: 1.3 mg/dL (06-11 @ 03:26)    ABG - ( 10 Oskar 2021 18:48 )  pH, Arterial: 7.390 pH, Blood: x     /  pCO2: 41    /  pO2: 157   / HCO3: 25    / Base Excess: -0.2  /  SaO2: 100.0             CARDIAC MARKERS ( 11 Jun 2021 03:26 )  x     / 0.23 ng/mL / x     / x     / x      CARDIAC MARKERS ( 10 Oskar 2021 11:37 )  x     / 0.40 ng/mL / 257 U/L / x     / 33.3 ng/mL      CAPILLARY BLOOD GLUCOSE      POCT Blood Glucose.: 106 mg/dL (11 Jun 2021 23:02)  POCT Blood Glucose.: 101 mg/dL (11 Jun 2021 16:14)  POCT Blood Glucose.: 123 mg/dL (11 Jun 2021 11:17)  POCT Blood Glucose.: 110 mg/dL (11 Jun 2021 08:13)  POCT Blood Glucose.: 115 mg/dL (11 Jun 2021 07:08)  POCT Blood Glucose.: 127 mg/dL (11 Jun 2021 05:28)  POCT Blood Glucose.: 119 mg/dL (11 Jun 2021 04:23)             PHYSICAL EXAM:  General:  thin, no acute distress  Neurology:  alert and oriented X 4, nonfocal, no gross deficits  Respiratory:  clear to auscultation bilaterally  CV:  regular rate and rhythm, normal S1 S2  Abdomen:  soft, nontender, nondistended, positive bowel sounds  Extremities:  warm, well perfused, no edema +DP pulses  Incisions:  midline sternal incision, c/d/i, sternum stable                
Subjective: Patient klying in bed, no acute distress noted.     VITAL SIGNS  Vital Signs Last 24 Hrs  T(C): 37.1 (21 @ 19:25), Max: 37.1 (21 @ 19:25)  T(F): 98.8 (21 @ 19:25), Max: 98.8 (21 @ 19:25)  HR: 97 (06-15-21 @ 00:00) (74 - 97)  BP: 104/66 (06-15-21 @ 00:00) (96/61 - 110/62)  RR: 17 (06-15-21 @ 00:00) (16 - 18)  SpO2: 94% (06-15-21 @ 00:00) (91% - 98%)  on room air            Telemetry/Alarms:  SR  LVEF: 60%    MEDICATIONS  acetaminophen   Tablet .. 650 milliGRAM(s) Oral every 6 hours PRN  aspirin  chewable 81 milliGRAM(s) Oral daily  atorvastatin 20 milliGRAM(s) Oral at bedtime  furosemide    Tablet 40 milliGRAM(s) Oral daily  lidocaine   Patch 1 Patch Transdermal every 24 hours  metoprolol tartrate 12.5 milliGRAM(s) Oral every 12 hours  oxyCODONE    IR 5 milliGRAM(s) Oral every 4 hours PRN  oxyCODONE    IR 10 milliGRAM(s) Oral every 4 hours PRN  pantoprazole    Tablet 40 milliGRAM(s) Oral daily  psyllium Powder 1 Packet(s) Oral three times a day  senna 2 Tablet(s) Oral at bedtime  sodium chloride 0.9% lock flush 3 milliLiter(s) IV Push every 8 hours      PHYSICAL EXAM  General: NAD  Neurology: A&Ox3, nonfocal, HEWITT x 4  Respiratory: Breath sounds clear bilaterally, no rale, rhonchi or wheezes   CV: RRR, S1S2, no murmurs, rubs or gallops  Abdominal: Soft, NT, ND +BS  Extremities: MAEx4, +1 edema BLE + peripheral pulses bilaterally  Incisions: Midsternal incision with +mepliex dressing in place, sternum stable  Psych: Appropriate mood and affect      06-13 @ 07:01  -   @ 07:00  --------------------------------------------------------  IN: 620 mL / OUT: 2525 mL / NET: -1905 mL     @ 07:01  -  15 @ 01:23  --------------------------------------------------------  IN: 0 mL / OUT: 1750 mL / NET: -1750 mL        Weights:  Daily     Daily Weight in k.7 (2021 04:45)  Admit Wt: Drug Dosing Weight  Height (cm): 152.4 (2021 08:15)  Weight (kg): 59 (2021 08:15)  BMI (kg/m2): 25.4 (2021 08:15)  BSA (m2): 1.55 (2021 08:15)    All laboratory results, radiology and medications reviewed.    LABS      139  |  103  |  12.3  ----------------------------<  97  4.2   |  31.0<H>  |  0.46<L>    Ca    8.3<L>      2021 06:04  Phos  2.5       Mg     2.2         TPro  5.0<L>  /  Alb  3.1<L>  /  TBili  1.1  /  DBili  x   /  AST  11  /  ALT  11  /  AlkPhos  35<L>                                   9.9    5.58  )-----------( 129      ( 2021 06:04 )             31.2            Bilirubin Total, Serum: 1.1 mg/dL ( @ 06:04)    < from: Xray Chest 1 View- PORTABLE-Routine (Xray Chest 1 View- PORTABLE-Routine in AM.) (21 @ 06:02) >  IMPRESSION:  Status post cardiac surgery.  Ill-defined LEFT diaphragm contour concerning for basilar airspace disease and/or effusion.    < end of copied text >      PAST MEDICAL & SURGICAL HISTORY:  HLD (hyperlipidemia)    MVP (mitral valve prolapse)    Severe mitral regurgitation    H/O tubal ligation    H/O vein stripping    H/O breast biopsy       
Subjective: Patient lying in bed, no acute distress noted, denies chest pian, pressure, palpitation, shortness of breath, abdominal pian, N/V/D.     VITAL SIGNS  Vital Signs Last 24 Hrs  T(C): 36.8 (21 @ 21:58), Max: 37.3 (21 @ 15:45)  T(F): 98.3 (21 @ 21:58), Max: 99.1 (21 @ 15:45)  HR: 77 (21 @ 21:58) (74 - 88)  BP: 101/62 (21 @ 21:58) (91/67 - 126/79)  RR: 18 (21 @ 21:58) (18 - 19)  SpO2: 94% (21 @ 21:58) (94% - 97%)  on room air    Telemetry/Alarms:  SR, one episode of short run of SVT, <6 sec, self resolved  LVEF: 60%    MEDICATIONS  acetaminophen   Tablet .. 650 milliGRAM(s) Oral every 6 hours PRN  aspirin  chewable 81 milliGRAM(s) Oral daily  atorvastatin 20 milliGRAM(s) Oral at bedtime  furosemide    Tablet 40 milliGRAM(s) Oral daily  lidocaine   Patch 1 Patch Transdermal every 24 hours  metoprolol tartrate 12.5 milliGRAM(s) Oral every 12 hours  ondansetron Injectable 4 milliGRAM(s) IV Push every 6 hours PRN  oxyCODONE    IR 5 milliGRAM(s) Oral every 4 hours PRN  oxyCODONE    IR 10 milliGRAM(s) Oral every 4 hours PRN  pantoprazole    Tablet 40 milliGRAM(s) Oral daily  psyllium Powder 1 Packet(s) Oral three times a day  senna 2 Tablet(s) Oral at bedtime  sodium chloride 0.9% lock flush 3 milliLiter(s) IV Push every 8 hours      PHYSICAL EXAM  General: NAD  Neurology: A&Ox3, nonfocal, HEWITT x 4  Respiratory: Breath sounds clear, fine rales at the bases bilaterally, no rhonchi  or wheezes   CV: RRR, S1S2, no murmurs, rubs or gallops  Abdominal: Soft, NT, ND +BS  Extremities: MAEx4, +1 edema to BLE + peripheral pulses bilaterally  Incisions: Midsternal incision with +mepliex dressing in place, sternum stable  Wires: +PW isolated      @ 07:01  -   @ 07:00  --------------------------------------------------------  IN: 290 mL / OUT: 1600 mL / NET: -1310 mL     @ 07:01  -   @ 00:11  --------------------------------------------------------  IN: 500 mL / OUT: 575 mL / NET: -75 mL        Weights:  Daily     Daily Weight in k.4 (2021 05:00)  Admit Wt: Drug Dosing Weight  Height (cm): 152.4 (2021 08:15)  Weight (kg): 59 (2021 08:15)  BMI (kg/m2): 25.4 (2021 08:15)  BSA (m2): 1.55 (2021 08:15)    All laboratory results, radiology and medications reviewed.    LABS      140  |  102  |  14.6  ----------------------------<  107<H>  3.4<L>   |  32.0<H>  |  0.58    Ca    8.7      2021 18:01  Phos  2.0       Mg     1.9         TPro  5.4<L>  /  Alb  3.4  /  TBili  1.0  /  DBili  x   /  AST  13  /  ALT  13  /  AlkPhos  38<L>                                   9.6    6.37  )-----------( 89       ( 2021 03:10 )             30.2            Bilirubin Total, Serum: 1.0 mg/dL ( @ 18:01)    CAPILLARY BLOOD GLUCOSE  POCT Blood Glucose.: 87 mg/dL (2021 11:58)  POCT Blood Glucose.: 110 mg/dL (2021 08:30)         < from: Xray Chest 1 View- PORTABLE-Routine (21 @ 07:58) >  FINDINGS:   2021 chest available for review.  The lungs are clear of gross airspace consolidations or effusions. No pneumothorax.        The heart and mediastinum are within normal limits following cardiac  surgery.    Visualized osseous structures are intact.  Upper thoracic levoscoliosis.    < end of copied text >          PAST MEDICAL & SURGICAL HISTORY:  HLD (hyperlipidemia)    MVP (mitral valve prolapse)    Severe mitral regurgitation    H/O tubal ligation    H/O vein stripping    H/O breast biopsy       
Subjective:  70yo F resting comfortable, no c/o pain.      HPI:  71 year old female present today for PST. She reports DR. Loredo her cardiologist who monitor closely her MVP. Patient reports on recent PARVEEN it was noted with worsen mitral regurgitation and ruptured chordae. She is now schedule for Mitral valve repair , possible replacement with Dr. Rossi on 6/1/21         PAST MEDICAL & SURGICAL HISTORY:  HLD (hyperlipidemia)    MVP (mitral valve prolapse)    Severe mitral regurgitation    H/O tubal ligation    H/O vein stripping    H/O breast biopsy            MEDICATIONS  (STANDING):  aspirin  chewable 81 milliGRAM(s) Oral daily  atorvastatin 20 milliGRAM(s) Oral at bedtime  cefuroxime  IVPB 1500 milliGRAM(s) IV Intermittent every 8 hours  chlorhexidine 0.12% Liquid 5 milliLiter(s) Oral Mucosa two times a day  dextrose 50% Injectable 50 milliLiter(s) IV Push every 15 minutes  dextrose 50% Injectable 25 milliLiter(s) IV Push every 15 minutes  insulin regular Infusion 2 Unit(s)/Hr (2 mL/Hr) IV Continuous <Continuous>  norepinephrine Infusion 0.05 MICROgram(s)/kG/Min (5.53 mL/Hr) IV Continuous <Continuous>  pantoprazole    Tablet 40 milliGRAM(s) Oral daily  psyllium Powder 1 Packet(s) Oral three times a day  senna 2 Tablet(s) Oral at bedtime  sodium chloride 0.9%. 1000 milliLiter(s) (10 mL/Hr) IV Continuous <Continuous>  sodium chloride 0.9%. 1000 milliLiter(s) (5 mL/Hr) IV Continuous <Continuous>  vancomycin  IVPB 1000 milliGRAM(s) IV Intermittent every 12 hours    MEDICATIONS  (PRN):  ondansetron Injectable 4 milliGRAM(s) IV Push every 6 hours PRN Nausea and/or Vomiting          Allergies    No Known Drug Allergies  patient is allergic to Spinach (Unknown)    Intolerances          WEIGHTS:  Daily Height in cm: 154.94 (10 Oskar 2021 06:21)    Daily   Admit Wt: Drug Dosing Weight  Height (cm): 5 (10 Oskar 2021 10:41)  Weight (kg): 59 (10 Oskar 2021 06:21)  BMI (kg/m2): 84755 (10 Oskar 2021 10:41)  BSA (m2): 0.13 (10 Oskar 2021 10:41)  I&O's Summary    10 Oskar 2021 07:01  -  11 Jun 2021 06:04  --------------------------------------------------------  IN: 1943.2 mL / OUT: 1890 mL / NET: 53.2 mL        VITAL SIGNS:  ICU Vital Signs Last 24 Hrs  T(C): 37.5 (11 Jun 2021 04:00), Max: 37.6 (10 Oskar 2021 20:00)  T(F): 99.5 (11 Jun 2021 04:00), Max: 99.7 (10 Oskar 2021 20:00)  HR: 75 (11 Jun 2021 06:00) (68 - 84)  BP: 96/57 (10 Oskar 2021 06:21) (96/57 - 96/57)  BP(mean): --  ABP: 109/54 (11 Jun 2021 06:00) (83/27 - 164/67)  ABP(mean): 73 (11 Jun 2021 06:00) (51 - 113)  RR: 18 (11 Jun 2021 06:00) (11 - 25)  SpO2: 98% (11 Jun 2021 06:00) (97% - 100%)        All laboratory results, radiology and medications reviewed.    LABS:  06-11    141  |  109<H>  |  12.2  ----------------------------<  130<H>  4.3   |  25.0  |  0.44<L>    Ca    8.0<L>      11 Jun 2021 03:26  Mg     2.1     06-11    TPro  5.0<L>  /  Alb  3.3  /  TBili  1.3  /  DBili  x   /  AST  28  /  ALT  14  /  AlkPhos  29<L>  06-11                                 9.9    7.57  )-----------( 116      ( 11 Jun 2021 03:26 )             31.2          PT/INR - ( 10 Oskar 2021 11:37 )   PT: 14.3 sec;   INR: 1.25 ratio         PTT - ( 10 Oskar 2021 11:37 )  PTT:34.6 sec  Bilirubin Total, Serum: 1.3 mg/dL (06-11 @ 03:26)  Bilirubin Total, Serum: 1.2 mg/dL (06-10 @ 11:37)    ABG - ( 10 Oskar 2021 18:48 )  pH, Arterial: 7.390 pH, Blood: x     /  pCO2: 41    /  pO2: 157   / HCO3: 25    / Base Excess: -0.2  /  SaO2: 100.0             CARDIAC MARKERS ( 11 Jun 2021 03:26 )  x     / 0.23 ng/mL / x     / x     / x      CARDIAC MARKERS ( 10 Oskar 2021 11:37 )  x     / 0.40 ng/mL / 257 U/L / x     / 33.3 ng/mL      CAPILLARY BLOOD GLUCOSE      POCT Blood Glucose.: 127 mg/dL (11 Jun 2021 05:28)  POCT Blood Glucose.: 119 mg/dL (11 Jun 2021 04:23)  POCT Blood Glucose.: 126 mg/dL (11 Jun 2021 02:15)  POCT Blood Glucose.: 134 mg/dL (11 Jun 2021 00:04)  POCT Blood Glucose.: 146 mg/dL (10 Oskar 2021 21:50)  POCT Blood Glucose.: 140 mg/dL (10 Oskar 2021 19:55)  POCT Blood Glucose.: 125 mg/dL (10 Oskar 2021 17:00)  POCT Blood Glucose.: 97 mg/dL (10 Oskar 2021 16:00)  POCT Blood Glucose.: 116 mg/dL (10 Oskar 2021 14:14)  POCT Blood Glucose.: 169 mg/dL (10 Oskar 2021 12:43)             PHYSICAL EXAM:  General:  well nourished, no acute distress  Neurology:  alert and oriented X 4, nonfocal, no gross deficits  Respiratory:  clear to auscultation bilaterally  CV:  regular rate and rhythm, normal S1 S2  Abdomen:  soft, nontender, nondistended, positive bowel sounds  Extremities:  warm, well perfused, no edema +DP pulses  Incisions:  midline sternal incision, c/d/i, sternum stable

## 2021-06-15 NOTE — PROGRESS NOTE ADULT - PROBLEM SELECTOR PROBLEM 1
Severe mitral regurgitation

## 2021-06-15 NOTE — PROGRESS NOTE ADULT - PROBLEM/PLAN-3
Addended by: JOSE ALVARADO on: 1/18/2021 04:48 PM     Modules accepted: Orders    
DISPLAY PLAN FREE TEXT

## 2021-06-15 NOTE — DISCHARGE NOTE PROVIDER - NSDCCPCAREPLAN_GEN_ALL_CORE_FT
PRINCIPAL DISCHARGE DIAGNOSIS  Diagnosis: Severe mitral regurgitation  Assessment and Plan of Treatment: Now status post Mitral valve repair.  1. Take ALL of your medications as ordered. Fill your prescriptions the day you are discharged and take according to the schedule you were given. Continue to take a stool softener if you are taking narcotic pain medications. AVOID medications such as ibuprofen or naproxen if you have had bypass surgery. If you have any questions or are unable to fill the prescriptions, please call the office right away at [phone].  2. Shower daily. Clean all incisions daily while showering with warm water and mild soap, pat dry with a clean towel and do not cover with any dressings unless instructed to. No bathing, swimming in a pool or the ocean until instructed by MD.  DO NOT use creams or lotions on the wound.  3. We advise that you do not drive until instructed by MD.   4. You may not return to work until instructed by MD.   5. Please eat a low fat, low cholesterol, low salt diet. (No added/extra salt)  6. Weigh yourself every day in the morning and record it in the weight log in your red folder. Notify the office of any weight gain more than 2-3 pounds in 24 hours.  7. Continue breathing exercises several times a day. Continue to use your heart pillow.  8. No heavy lifting nothing greater than 5 pounds until cleared by MD.   9. Call / Notify MD any fever greater than 101.0, any drainage from incisions or if they become red, hot or very tender to the touch.  10. Increase activity as tolerated. Walk indoors and/or outdoors at least 3 times a day.        SECONDARY DISCHARGE DIAGNOSES  Diagnosis: Severe mitral regurgitation  Assessment and Plan of Treatment: - You will receive a wallet card about your new valve in the mail.  Please carry it with you to present to anyone who may ask if you have any medical implants.  - Be sure to inform your doctors including your dentist about your valve since you will need to take antibiotics to reduce the risk of infection before certain medical and dental procedures.      Diagnosis: HLD (hyperlipidemia)  Assessment and Plan of Treatment: Please follow up with your cardiologist and primary care doctor  Continue to take all medications as ordered     PRINCIPAL DISCHARGE DIAGNOSIS  Diagnosis: Severe mitral regurgitation  Assessment and Plan of Treatment: Now status post Mitral valve repair.  1. Take ALL of your medications as ordered. Fill your prescriptions the day you are discharged and take according to the schedule you were given. Continue to take a stool softener if you are taking narcotic pain medications. If you have any questions or are unable to fill the prescriptions, please call the office right away at [phone].  2. Shower daily. Clean all incisions daily while showering with warm water and mild soap, pat dry with a clean towel and do not cover with any dressings unless instructed to. No bathing, swimming in a pool or the ocean until instructed by MD.  DO NOT use creams or lotions on the wound.  3. We advise that you do not drive until instructed by MD.   4. You may not return to work until instructed by MD.   5. Please eat a low fat, low cholesterol, low salt diet. (No added/extra salt)  6. Weigh yourself every day in the morning and record it in the weight log in your red folder. Notify the office of any weight gain more than 2-3 pounds in 24 hours.  7. Continue breathing exercises several times a day. Continue to use your heart pillow.  8. No heavy lifting nothing greater than 5 pounds until cleared by MD.   9. Call / Notify MD any fever greater than 101.0, any drainage from incisions or if they become red, hot or very tender to the touch.  10. Increase activity as tolerated. Walk indoors and/or outdoors at least 3 times a day.  11. Keep surgical or sports bra on 24 hours a day for at least 2 weeks.         SECONDARY DISCHARGE DIAGNOSES  Diagnosis: Severe mitral regurgitation  Assessment and Plan of Treatment: - You will receive a wallet card about your new valve ring in the mail.  Please carry it with you to present to anyone who may ask if you have any medical implants.  - Be sure to inform your doctors including your dentist about your valve since you will need to take antibiotics to reduce the risk of infection before certain medical and dental procedures.      Diagnosis: HLD (hyperlipidemia)  Assessment and Plan of Treatment: Please follow up with your cardiologist and primary care doctor  Continue to take all medications as ordered

## 2021-06-15 NOTE — DISCHARGE NOTE PROVIDER - NSDCMRMEDTOKEN_GEN_ALL_CORE_FT
atenolol 25 mg oral tablet: 1 tab(s) orally once a day  atorvastatin 10 mg oral tablet: 1 tab(s) orally once a day  Calcium 500+D oral tablet, chewable:   Multiple Vitamins oral tablet: 1 tab(s) orally once a day   acetaminophen 325 mg oral tablet: 2 tab(s) orally every 6 hours, As needed, Mild Pain (1 - 3)  Aspirin Enteric Coated 81 mg oral delayed release tablet: 1 tab(s) orally once a day  atorvastatin 10 mg oral tablet: 1 tab(s) orally once a day  Calcium 500+D oral tablet, chewable:   Lasix 20 mg oral tablet: 1 tab(s) orally once a day  Metoprolol Tartrate 25 mg oral tablet: 0.5 tab(s) orally 2 times a day   Multiple Vitamins oral tablet: 1 tab(s) orally once a day  Percocet 5 mg-325 mg oral tablet: 1-2 tab(s) orally every 6 hours, As Needed -for moderate pain  - for severe pain MDD:6  psyllium 3.4 g/7 g oral powder for reconstitution: 1 packet(s) orally 2 times a day, As Needed  senna oral tablet: 2 tab(s) orally once a day (at bedtime)

## 2021-06-15 NOTE — DISCHARGE NOTE PROVIDER - CARE PROVIDERS DIRECT ADDRESSES
[email or web address]

## 2021-06-15 NOTE — PROGRESS NOTE ADULT - ASSESSMENT
71 year old female with a medical history of Mitral Valve prolapse was noted to have worsening MR an ruptured chordae on PARVEEN ordered by Cardiologist Dr. Byrnes. Patient now s/p MV Repair with Dr. Rossi on 6/10/21, Post op course remains uneventful at this time.
71 year old female present today for PST. She reports DR. Loredo her cardiologist who monitor closely her MVP. Patient reports on recent PARVEEN it was noted with worsen mitral regurgitation and ruptured chordae.  On 6/10 pt underwent MV Repair with Dr. Rossi, post op course uneventful.
71 year old female with a medical history of Mitral Valve prolapse was noted to have worsening MR an ruptured chordae on PARVEEN ordered by Cardiologist Dr. Byrnes. Patient now s/p MV Repair with Dr. Rossi on 6/10/21, Post op course remains uneventful at this time.
71 year old female with a medical history of Mitral Valve prolapse was noted to have worsening MR an ruptured chordae on PARVEEN ordered by Cardiologist Dr. Byrnes. Patient now s/p MV Repair with Dr. Rossi on 6/10/21, Post op course remains uneventful at this time.
71 year old female present today for PST. She reports DR. Loredo her cardiologist who monitor closely her MVP. Patient reports on recent PARVEEN it was noted with worsen mitral regurgitation and ruptured chordae.  On 6/10 pt underwent MV Repair with Dr. Rossi, post op course uneventful.

## 2021-06-15 NOTE — DISCHARGE NOTE PROVIDER - NSDCFUADDAPPT_GEN_ALL_CORE_FT
Please follow up with Dr. Rossi on    Please call the Cardiothoracic Surgery office at [phone] if you are experiencing any shortness of breath, chest pain, fevers or chills, drainage from the incisions, persistent nausea, vomiting or if you have any questions about your medications. If the symptoms are severe, call 911 and go to the nearest hospital. You can also call [phone] for an emergency [facility] ambulance, which will take you to the closest [facility].    If you need any assistance for making any appointments for a new consult or referral in any specialty, please call our [facility] Clinical Coordination Center at [phone].   Please follow up with Dr. Rossi in 2 weeks. Please call for an appointment.    Your Care Navigator Nurse Practitioner will be in touch to see you in your home within a few days from discharge. The Follow Your Heart program can help ensure you understand your medications, discharge instructions and answer any questions you may have at that time. They are also a great source to address concerns during the day and may be reached at [phone].     If you need any assistance for making any appointments for a new consult or referral in any specialty, please call our [facility] Clinical Coordination Center at [phone].

## 2021-06-15 NOTE — DISCHARGE NOTE PROVIDER - REASON FOR ADMISSION
Mitral valve prolapse, worsening Mitral valve regurgitation, now s/p Mitral valve Repair with Dr. Rossi on 6/10/21

## 2021-06-15 NOTE — PROGRESS NOTE ADULT - PROBLEM SELECTOR PLAN 3
SCDs for DVT prophylaxis. Lovenox held for thrombocytopenia.   Pantoprazole for GI prophylaxis.    Plan to be discussed / reviewed with CT Surgery attending / team during AM rounds.
SCDs, Lovenox for DVT prophylaxis  Pantoprazole for GI prophylaxis
SCDs, Lovenox for DVT prophylaxis  Pantoprazole for GI prophylaxis
SCDs for DVT prophylaxis. Lovenox held for thrombocytopenia.   Pantoprazole for GI prophylaxis.    Plan to be discussed / reviewed with CT Surgery attending / team during AM rounds.
SCDs for DVT prophylaxis. Lovenox held for thrombocytopenia.   Pantoprazole for GI prophylaxis.    Plan to be discussed / reviewed with CT Surgery attending / team during AM rounds.

## 2021-06-15 NOTE — PROGRESS NOTE ADULT - PROBLEM SELECTOR PLAN 1
S/p MV Repair on 6/10 with Dr. Rossi  Continue lopressor as tolerated by HR and BP.   Continue aspirin and statin for post valve prophylaxis.  Oxygenating well, coughing and deep breathing exercises/incentive spirometry encouraged.  Continue Lasix 40mg PO QD, supplement electrolytes PRN to keep K>4 and Mg>2.  Continue with PT, increase activity as tolerated.  Tylenol and Oxy IR PRN for analgesia.  Continue bowel regimen PRN.
S/p MV Repair on 6/10 with Dr. Rossi  Neurologically intact.  Hemodynamically stable.  Continue lopressor as tolerated by HR and BP.   Continue aspirin and statin for post valve prophylaxis.  Oxygenating well, coughing and deep breathing exercises/incentive spirometry encouraged.  Continue Lasix 40mg PO QD, supplement electrolytes PRN to keep K>4 and Mg>2.  Continue with PT, increase activity as tolerated.  FS AC+HS with coverage for glycemic control.  Tylenol and Oxy IR PRN for analgesia.  Continue bowel regimen PRN.
s/p MV Repair on 6/10 with Dr. Rossi  No inotropic support, minimal pressor support  Encourage IS hourly while awake  Ambulate with PT assist daily
S/p MV Repair on 6/10 with Dr. Rossi  Continue lopressor as tolerated by HR and BP.   Continue aspirin and statin for post valve prophylaxis.  Oxygenating well, coughing and deep breathing exercises/incentive spirometry encouraged.  Continue Lasix 40mg PO QD, supplement electrolytes PRN to keep K>4 and Mg>2.  Continue with PT, increase activity as tolerated.  Tylenol and Oxy IR PRN for analgesia.  Continue bowel regimen PRN.
s/p MV Repair on 6/10 with Dr. Rossi  Encourage IS hourly while awake  Ambulate with PT assist daily  Most likely will transfer to floor later today

## 2021-06-15 NOTE — DISCHARGE NOTE PROVIDER - PROVIDER TOKENS
PROVIDER:[TOKEN:[[id number]]],PROVIDER:[TOKEN:[[id number]]] PROVIDER:[TOKEN:[[id number]],FOLLOWUP:[2 weeks]],PROVIDER:[TOKEN:[[id number]],FOLLOWUP:[1 month]]

## 2021-06-15 NOTE — DISCHARGE NOTE NURSING/CASE MANAGEMENT/SOCIAL WORK - PATIENT PORTAL LINK FT
You can access the FollowMyHealth Patient Portal offered by [facility] by registering at the following website: [email or web address]. By joining [facility]’s FollowMyHealth portal, you will also be able to view your health information using other applications (apps) compatible with our system.

## 2021-06-15 NOTE — DISCHARGE NOTE PROVIDER - HOSPITAL COURSE
71 year olf F with PMH of Mitral Valve prolapse was noted to have worsening MR an ruptured chordae on PARVEEN ordered by Cardiologist Dr. Byrnes. Patient now s/p MV Repair with Dr. Rossi on 6/10/21. Post op course remains uneventful at this time. Patient is now stable for discharge home per Dr. Rossi. 71 year olf F with PMH of Mitral Valve prolapse was noted to have worsening MR an ruptured chordae on PARVEEN ordered by Cardiologist Dr. Byrnes. Patient now s/p MV Repair with Dr. Rossi on 6/10/21. Post op course notable for acute blood loss anemia requiring a blood transfusion. Patient had no further complications. Patient is now stable for discharge home per Dr. Rossi.

## 2021-06-16 RX ORDER — ASPIRIN ENTERIC COATED TABLETS 81 MG 81 MG/1
81 TABLET, DELAYED RELEASE ORAL
Refills: 0 | Status: ACTIVE | COMMUNITY

## 2021-06-16 RX ORDER — ATENOLOL 50 MG/1
TABLET ORAL
Refills: 0 | Status: DISCONTINUED | COMMUNITY
End: 2021-06-16

## 2021-06-16 RX ORDER — ATORVASTATIN CALCIUM 20 MG/1
20 TABLET, FILM COATED ORAL
Refills: 0 | Status: DISCONTINUED | COMMUNITY
End: 2021-06-16

## 2021-06-16 RX ORDER — ATORVASTATIN CALCIUM 10 MG/1
10 TABLET, FILM COATED ORAL
Refills: 0 | Status: ACTIVE | COMMUNITY

## 2021-06-17 ENCOUNTER — APPOINTMENT (OUTPATIENT)
Dept: CARE COORDINATION | Facility: HOME HEALTH | Age: 71
End: 2021-06-17
Payer: MEDICARE

## 2021-06-17 VITALS
SYSTOLIC BLOOD PRESSURE: 128 MMHG | RESPIRATION RATE: 16 BRPM | HEIGHT: 60 IN | WEIGHT: 127 LBS | DIASTOLIC BLOOD PRESSURE: 82 MMHG | OXYGEN SATURATION: 98 % | BODY MASS INDEX: 24.94 KG/M2 | HEART RATE: 100 BPM

## 2021-06-17 PROCEDURE — 99024 POSTOP FOLLOW-UP VISIT: CPT

## 2021-06-17 NOTE — REASON FOR VISIT
[Follow-Up: _____] : a [unfilled] follow-up visit [Family Member] : family member [FreeTextEntry1] : FOLLOW YOUR HEART- Transitional Care Management Program- [facility]\par \par

## 2021-06-17 NOTE — PHYSICAL EXAM
[Sclera] : the sclera and conjunctiva were normal [Neck Appearance] : the appearance of the neck was normal [Respiration, Rhythm And Depth] : normal respiratory rhythm and effort [Exaggerated Use Of Accessory Muscles For Inspiration] : no accessory muscle use [Auscultation Breath Sounds / Voice Sounds] : lungs were clear to auscultation bilaterally [Apical Impulse] : the apical impulse was normal [Heart Sounds] : normal S1 and S2 [Murmurs] : no murmurs [1+] : left 1+ [Abdomen Soft] : soft [Abdomen Tenderness] : non-tender [Abnormal Walk] : normal gait [Skin Color & Pigmentation] : normal skin color and pigmentation [Skin Turgor] : normal skin turgor [] : no rash [Oriented To Time, Place, And Person] : oriented to person, place, and time [Impaired Insight] : insight and judgment were intact [Affect] : the affect was normal [FreeTextEntry1] : Tachycardic [FreeTextEntry2] : B/L LE without edema, B/L calves soft, NT

## 2021-06-17 NOTE — HISTORY OF PRESENT ILLNESS
[FreeTextEntry1] : 71 YOF with PMH of Mitral Valve prolapse was noted to have worsening MR \par an ruptured chordae on PARVEEN ordered by Cardiologist Dr. Byrnes. Patient now \par s/p MV Repair with Dr. Rossi on 6/10/21. Post op course notable for acute \par blood loss anemia requiring a blood transfusion. Patient had no further \par complications. Patient discharged home in stable condition with support of family, home care services and [facility] team. \par Inital FY visit in home. \par CC "I'm really feeling great, despite mental stress, I'm surprised at how well I feel" \par \par *Of note pt  is in poor health currently requiring around the clock care and family will be taking him to [facility] next week to further diagnose his symptoms/condition.

## 2021-06-22 ENCOUNTER — APPOINTMENT (OUTPATIENT)
Dept: CARE COORDINATION | Facility: HOME HEALTH | Age: 71
End: 2021-06-22
Payer: MEDICARE

## 2021-06-22 VITALS
DIASTOLIC BLOOD PRESSURE: 72 MMHG | OXYGEN SATURATION: 98 % | HEART RATE: 105 BPM | HEIGHT: 60 IN | SYSTOLIC BLOOD PRESSURE: 112 MMHG | BODY MASS INDEX: 24.54 KG/M2 | RESPIRATION RATE: 16 BRPM | WEIGHT: 125 LBS

## 2021-06-22 PROCEDURE — 99024 POSTOP FOLLOW-UP VISIT: CPT

## 2021-06-22 RX ORDER — METOPROLOL TARTRATE 25 MG/1
25 TABLET, FILM COATED ORAL EVERY 8 HOURS
Qty: 90 | Refills: 0 | Status: ACTIVE | COMMUNITY

## 2021-06-22 NOTE — PHYSICAL EXAM
[Sclera] : the sclera and conjunctiva were normal [Neck Appearance] : the appearance of the neck was normal [Respiration, Rhythm And Depth] : normal respiratory rhythm and effort [Exaggerated Use Of Accessory Muscles For Inspiration] : no accessory muscle use [Auscultation Breath Sounds / Voice Sounds] : lungs were clear to auscultation bilaterally [Apical Impulse] : the apical impulse was normal [Heart Sounds] : normal S1 and S2 [Murmurs] : no murmurs [FreeTextEntry1] : Tachycardic [1+] : left 1+ [Abdomen Soft] : soft [FreeTextEntry2] : B/L LE without edema, B/L calves soft, NT [Abdomen Tenderness] : non-tender [Abnormal Walk] : normal gait [Skin Turgor] : normal skin turgor [Skin Color & Pigmentation] : normal skin color and pigmentation [] : no rash [Impaired Insight] : insight and judgment were intact [Oriented To Time, Place, And Person] : oriented to person, place, and time [Affect] : the affect was normal

## 2021-06-22 NOTE — ASSESSMENT
[FreeTextEntry1] : Pt recovering well at home s/p OHS. Reviewed all medications and dosages with pt understanding. Pt has all medications in home and is taking as prescribed. Pain controlled with current medication regimen. Pt HR last week 100-104 today 105-107 regular, pt asymptomatic with exception of possible palpitations earlier this morning, but she was also under emotional strain at the time. Pt took last dose of Lasix today, ensures she stays adequately hydrated throughout day. Wt stable. Pt metoprolol tartrate increased to 25 mg TID.  No further new symptoms, issues or concerns to report at this time.\par

## 2021-06-22 NOTE — HISTORY OF PRESENT ILLNESS
[FreeTextEntry1] : 71 YOF with PMH of Mitral Valve prolapse was noted to have worsening MR \par an ruptured chordae on PARVEEN ordered by Cardiologist Dr. Byrnes. Patient now \par s/p MV Repair with Dr. Rossi on 6/10/21. Post op course notable for acute \par blood loss anemia requiring a blood transfusion. Patient had no further \par complications. Patient discharged home in stable condition with support of family, home care services and [facility] team. \par Second [facility] visit in home as per pt request. She wanted to know if her BP and HR were normal. She had some "anxiety" earlier today when speaking with her  (now at [facility]) and wanted to make sure there wasn't anything to be concerned about.  \par CC "I'm feeling well, just felt anxious this morning" \par Pt states she may have felt "a little palpitations or just more aware of my heart beating" She denies SOB, CP, dizziness. \par \par

## 2021-06-22 NOTE — REASON FOR VISIT
[Follow-Up: _____] : a [unfilled] follow-up visit [Family Member] : family member [FreeTextEntry1] : FOLLOW YOUR HEART- Transitional Care Management Program- [facility]\par \par

## 2021-06-23 PROBLEM — I34.0 MITRAL REGURGITATION: Status: ACTIVE | Noted: 2021-04-12

## 2021-06-23 PROBLEM — Z98.890 S/P MITRAL VALVE REPAIR: Status: ACTIVE | Noted: 2021-06-16

## 2021-06-24 ENCOUNTER — TRANSCRIPTION ENCOUNTER (OUTPATIENT)
Age: 71
End: 2021-06-24

## 2021-06-29 ENCOUNTER — APPOINTMENT (OUTPATIENT)
Dept: CARDIOTHORACIC SURGERY | Facility: CLINIC | Age: 71
End: 2021-06-29
Payer: MEDICARE

## 2021-06-29 VITALS
HEIGHT: 60 IN | WEIGHT: 126 LBS | OXYGEN SATURATION: 98 % | DIASTOLIC BLOOD PRESSURE: 81 MMHG | SYSTOLIC BLOOD PRESSURE: 120 MMHG | HEART RATE: 96 BPM | BODY MASS INDEX: 24.74 KG/M2 | RESPIRATION RATE: 18 BRPM

## 2021-06-29 DIAGNOSIS — I34.0 NONRHEUMATIC MITRAL (VALVE) INSUFFICIENCY: ICD-10-CM

## 2021-06-29 DIAGNOSIS — Z98.890 OTHER SPECIFIED POSTPROCEDURAL STATES: ICD-10-CM

## 2021-06-29 PROCEDURE — 99024 POSTOP FOLLOW-UP VISIT: CPT

## 2021-06-29 RX ORDER — SENNA 8.6 MG/1
8.6 TABLET, FILM COATED ORAL
Refills: 0 | Status: COMPLETED | COMMUNITY
End: 2021-06-29

## 2021-06-29 RX ORDER — FUROSEMIDE 20 MG/1
20 TABLET ORAL
Refills: 0 | Status: COMPLETED | COMMUNITY
End: 2021-06-29

## 2021-06-29 RX ORDER — OXYCODONE HYDROCHLORIDE AND ACETAMINOPHEN 5; 325 MG/1; MG/1
5-325 TABLET ORAL
Refills: 0 | Status: COMPLETED | COMMUNITY
End: 2021-06-29

## 2021-07-08 ENCOUNTER — TRANSCRIPTION ENCOUNTER (OUTPATIENT)
Age: 71
End: 2021-07-08

## 2021-07-15 ENCOUNTER — TRANSCRIPTION ENCOUNTER (OUTPATIENT)
Age: 71
End: 2021-07-15

## 2022-04-25 ENCOUNTER — APPOINTMENT (OUTPATIENT)
Dept: DERMATOLOGY | Facility: CLINIC | Age: 72
End: 2022-04-25